# Patient Record
Sex: MALE | Race: WHITE | NOT HISPANIC OR LATINO | Employment: FULL TIME | URBAN - METROPOLITAN AREA
[De-identification: names, ages, dates, MRNs, and addresses within clinical notes are randomized per-mention and may not be internally consistent; named-entity substitution may affect disease eponyms.]

---
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
## 2024-02-18 ENCOUNTER — HOSPITAL ENCOUNTER (INPATIENT)
Facility: HOSPITAL | Age: 19
LOS: 6 days | Discharge: HOME | DRG: 558 | End: 2024-02-25
Attending: EMERGENCY MEDICINE | Admitting: INTERNAL MEDICINE
Payer: COMMERCIAL

## 2024-02-18 DIAGNOSIS — M79.89 ARM SWELLING: ICD-10-CM

## 2024-02-18 DIAGNOSIS — M62.82 RHABDOMYOLYSIS: Primary | ICD-10-CM

## 2024-02-18 DIAGNOSIS — R79.89 ELEVATED LFTS: ICD-10-CM

## 2024-02-18 LAB
BACTERIA UR QL AUTO: ABNORMAL /HPF
BASOPHILS # BLD AUTO: 0.07 THOUSANDS/ÂΜL (ref 0–0.1)
BASOPHILS NFR BLD AUTO: 1 % (ref 0–1)
BILIRUB UR QL STRIP: NEGATIVE
CLARITY UR: CLEAR
COLOR UR: ABNORMAL
EOSINOPHIL # BLD AUTO: 0.13 THOUSAND/ÂΜL (ref 0–0.61)
EOSINOPHIL NFR BLD AUTO: 1 % (ref 0–6)
ERYTHROCYTE [DISTWIDTH] IN BLOOD BY AUTOMATED COUNT: 13 % (ref 11.6–15.1)
GLUCOSE UR STRIP-MCNC: NEGATIVE MG/DL
HCT VFR BLD AUTO: 42 % (ref 36.5–49.3)
HGB BLD-MCNC: 15.2 G/DL (ref 12–17)
HGB UR QL STRIP.AUTO: ABNORMAL
IMM GRANULOCYTES # BLD AUTO: 0.05 THOUSAND/UL (ref 0–0.2)
IMM GRANULOCYTES NFR BLD AUTO: 0 % (ref 0–2)
KETONES UR STRIP-MCNC: ABNORMAL MG/DL
LEUKOCYTE ESTERASE UR QL STRIP: NEGATIVE
LYMPHOCYTES # BLD AUTO: 1.66 THOUSANDS/ÂΜL (ref 0.6–4.47)
LYMPHOCYTES NFR BLD AUTO: 12 % (ref 14–44)
MCH RBC QN AUTO: 31.2 PG (ref 26.8–34.3)
MCHC RBC AUTO-ENTMCNC: 36.2 G/DL (ref 31.4–37.4)
MCV RBC AUTO: 86 FL (ref 82–98)
MONOCYTES # BLD AUTO: 0.92 THOUSAND/ÂΜL (ref 0.17–1.22)
MONOCYTES NFR BLD AUTO: 6 % (ref 4–12)
MUCOUS THREADS UR QL AUTO: ABNORMAL
NEUTROPHILS # BLD AUTO: 11.62 THOUSANDS/ÂΜL (ref 1.85–7.62)
NEUTS SEG NFR BLD AUTO: 80 % (ref 43–75)
NITRITE UR QL STRIP: NEGATIVE
NON-SQ EPI CELLS URNS QL MICRO: ABNORMAL /HPF
NRBC BLD AUTO-RTO: 0 /100 WBCS
PH UR STRIP.AUTO: 6 [PH]
PLATELET # BLD AUTO: 307 THOUSANDS/UL (ref 149–390)
PMV BLD AUTO: 9.4 FL (ref 8.9–12.7)
PROT UR STRIP-MCNC: ABNORMAL MG/DL
RBC # BLD AUTO: 4.87 MILLION/UL (ref 3.88–5.62)
RBC #/AREA URNS AUTO: ABNORMAL /HPF
SP GR UR STRIP.AUTO: 1.03 (ref 1–1.03)
UROBILINOGEN UR STRIP-ACNC: 2 MG/DL
WBC # BLD AUTO: 14.45 THOUSAND/UL (ref 4.31–10.16)
WBC #/AREA URNS AUTO: ABNORMAL /HPF

## 2024-02-18 PROCEDURE — 81001 URINALYSIS AUTO W/SCOPE: CPT

## 2024-02-18 PROCEDURE — 85025 COMPLETE CBC W/AUTO DIFF WBC: CPT

## 2024-02-18 PROCEDURE — 96365 THER/PROPH/DIAG IV INF INIT: CPT

## 2024-02-18 PROCEDURE — 82550 ASSAY OF CK (CPK): CPT

## 2024-02-18 PROCEDURE — 80053 COMPREHEN METABOLIC PANEL: CPT

## 2024-02-18 PROCEDURE — 96366 THER/PROPH/DIAG IV INF ADDON: CPT

## 2024-02-18 PROCEDURE — 36415 COLL VENOUS BLD VENIPUNCTURE: CPT

## 2024-02-18 PROCEDURE — 99284 EMERGENCY DEPT VISIT MOD MDM: CPT

## 2024-02-18 PROCEDURE — 99285 EMERGENCY DEPT VISIT HI MDM: CPT | Performed by: EMERGENCY MEDICINE

## 2024-02-18 RX ORDER — SODIUM CHLORIDE, SODIUM GLUCONATE, SODIUM ACETATE, POTASSIUM CHLORIDE, MAGNESIUM CHLORIDE, SODIUM PHOSPHATE, DIBASIC, AND POTASSIUM PHOSPHATE .53; .5; .37; .037; .03; .012; .00082 G/100ML; G/100ML; G/100ML; G/100ML; G/100ML; G/100ML; G/100ML
1000 INJECTION, SOLUTION INTRAVENOUS ONCE
Status: COMPLETED | OUTPATIENT
Start: 2024-02-18 | End: 2024-02-19

## 2024-02-18 RX ORDER — SODIUM CHLORIDE, SODIUM GLUCONATE, SODIUM ACETATE, POTASSIUM CHLORIDE, MAGNESIUM CHLORIDE, SODIUM PHOSPHATE, DIBASIC, AND POTASSIUM PHOSPHATE .53; .5; .37; .037; .03; .012; .00082 G/100ML; G/100ML; G/100ML; G/100ML; G/100ML; G/100ML; G/100ML
1000 INJECTION, SOLUTION INTRAVENOUS ONCE
Status: COMPLETED | OUTPATIENT
Start: 2024-02-19 | End: 2024-02-19

## 2024-02-18 RX ADMIN — SODIUM CHLORIDE, SODIUM GLUCONATE, SODIUM ACETATE, POTASSIUM CHLORIDE, MAGNESIUM CHLORIDE, SODIUM PHOSPHATE, DIBASIC, AND POTASSIUM PHOSPHATE 1000 ML: .53; .5; .37; .037; .03; .012; .00082 INJECTION, SOLUTION INTRAVENOUS at 22:49

## 2024-02-18 NOTE — LETTER
[facility] [address]  [address] [address]  Dept: [phone]    February 25, 2024     Patient: Michael Chan   YOB: 2005   Date of Visit: 2/18/2024       To Whom it May Concern:    Michael Chan is under my professional care. He was seen in the hospital from 2/18/2024 to 02/25/24. He may return to school on 2/26/24 without limitations.    If you have any questions or concerns, please don't hesitate to call.         Sincerely,          Anthony Valentine, DO

## 2024-02-19 ENCOUNTER — APPOINTMENT (INPATIENT)
Dept: NON INVASIVE DIAGNOSTICS | Facility: HOSPITAL | Age: 19
DRG: 558 | End: 2024-02-19
Payer: COMMERCIAL

## 2024-02-19 PROBLEM — M79.89 SWELLING OF ARM: Status: ACTIVE | Noted: 2024-02-19

## 2024-02-19 PROBLEM — E87.6 HYPOKALEMIA: Status: ACTIVE | Noted: 2024-02-19

## 2024-02-19 PROBLEM — M62.82 RHABDOMYOLYSIS: Status: ACTIVE | Noted: 2024-02-19

## 2024-02-19 LAB
ALBUMIN SERPL BCP-MCNC: 3.4 G/DL (ref 3.5–5)
ALBUMIN SERPL BCP-MCNC: 4.2 G/DL (ref 3.5–5)
ALP SERPL-CCNC: 44 U/L (ref 34–104)
ALP SERPL-CCNC: 55 U/L (ref 34–104)
ALT SERPL W P-5'-P-CCNC: 185 U/L (ref 7–52)
ALT SERPL W P-5'-P-CCNC: 238 U/L (ref 7–52)
AMPHETAMINES SERPL QL SCN: NEGATIVE
ANION GAP SERPL CALCULATED.3IONS-SCNC: 7 MMOL/L
ANION GAP SERPL CALCULATED.3IONS-SCNC: 9 MMOL/L
APTT PPP: 29 SECONDS (ref 23–37)
APTT PPP: 30 SECONDS (ref 23–37)
AST SERPL W P-5'-P-CCNC: 693 U/L (ref 13–39)
AST SERPL W P-5'-P-CCNC: 984 U/L (ref 13–39)
BARBITURATES UR QL: NEGATIVE
BASOPHILS # BLD AUTO: 0.08 THOUSANDS/ÂΜL (ref 0–0.1)
BASOPHILS NFR BLD AUTO: 1 % (ref 0–1)
BENZODIAZ UR QL: NEGATIVE
BILIRUB SERPL-MCNC: 0.41 MG/DL (ref 0.2–1)
BILIRUB SERPL-MCNC: 0.71 MG/DL (ref 0.2–1)
BUN SERPL-MCNC: 13 MG/DL (ref 5–25)
BUN SERPL-MCNC: 21 MG/DL (ref 5–25)
CALCIUM ALBUM COR SERPL-MCNC: 8.3 MG/DL (ref 8.3–10.1)
CALCIUM SERPL-MCNC: 7.8 MG/DL (ref 8.4–10.2)
CALCIUM SERPL-MCNC: 8.8 MG/DL (ref 8.4–10.2)
CHLORIDE SERPL-SCNC: 104 MMOL/L (ref 96–108)
CHLORIDE SERPL-SCNC: 106 MMOL/L (ref 96–108)
CK SERPL-CCNC: ABNORMAL U/L (ref 39–308)
CK SERPL-CCNC: ABNORMAL U/L (ref 39–308)
CO2 SERPL-SCNC: 25 MMOL/L (ref 21–32)
CO2 SERPL-SCNC: 25 MMOL/L (ref 21–32)
COCAINE UR QL: NEGATIVE
CREAT SERPL-MCNC: 0.65 MG/DL (ref 0.6–1.3)
CREAT SERPL-MCNC: 0.8 MG/DL (ref 0.6–1.3)
CRP SERPL QL: 10.4 MG/L
EOSINOPHIL # BLD AUTO: 0.15 THOUSAND/ÂΜL (ref 0–0.61)
EOSINOPHIL NFR BLD AUTO: 2 % (ref 0–6)
ERYTHROCYTE [DISTWIDTH] IN BLOOD BY AUTOMATED COUNT: 13.1 % (ref 11.6–15.1)
ERYTHROCYTE [SEDIMENTATION RATE] IN BLOOD: 1 MM/HOUR (ref 0–14)
FLUAV RNA RESP QL NAA+PROBE: NEGATIVE
FLUBV RNA RESP QL NAA+PROBE: NEGATIVE
GFR SERPL CREATININE-BSD FRML MDRD: 129 ML/MIN/1.73SQ M
GFR SERPL CREATININE-BSD FRML MDRD: 141 ML/MIN/1.73SQ M
GLUCOSE SERPL-MCNC: 121 MG/DL (ref 65–140)
GLUCOSE SERPL-MCNC: 91 MG/DL (ref 65–140)
HCT VFR BLD AUTO: 36.7 % (ref 36.5–49.3)
HETEROPH AB SER QL: NEGATIVE
HGB BLD-MCNC: 12.8 G/DL (ref 12–17)
IMM GRANULOCYTES # BLD AUTO: 0.04 THOUSAND/UL (ref 0–0.2)
IMM GRANULOCYTES NFR BLD AUTO: 0 % (ref 0–2)
INR PPP: 1.14 (ref 0.84–1.19)
INR PPP: 1.17 (ref 0.84–1.19)
LYMPHOCYTES # BLD AUTO: 1.88 THOUSANDS/ÂΜL (ref 0.6–4.47)
LYMPHOCYTES NFR BLD AUTO: 19 % (ref 14–44)
MAGNESIUM SERPL-MCNC: 1.9 MG/DL (ref 1.9–2.7)
MCH RBC QN AUTO: 31.1 PG (ref 26.8–34.3)
MCHC RBC AUTO-ENTMCNC: 34.9 G/DL (ref 31.4–37.4)
MCV RBC AUTO: 89 FL (ref 82–98)
METHADONE UR QL: NEGATIVE
MONOCYTES # BLD AUTO: 0.93 THOUSAND/ÂΜL (ref 0.17–1.22)
MONOCYTES NFR BLD AUTO: 9 % (ref 4–12)
NEUTROPHILS # BLD AUTO: 6.99 THOUSANDS/ÂΜL (ref 1.85–7.62)
NEUTS SEG NFR BLD AUTO: 69 % (ref 43–75)
NRBC BLD AUTO-RTO: 0 /100 WBCS
OPIATES UR QL SCN: NEGATIVE
OXYCODONE+OXYMORPHONE UR QL SCN: NEGATIVE
PCP UR QL: NEGATIVE
PHOSPHATE SERPL-MCNC: 3.2 MG/DL (ref 2.7–4.5)
PLATELET # BLD AUTO: 240 THOUSANDS/UL (ref 149–390)
PMV BLD AUTO: 9.6 FL (ref 8.9–12.7)
POTASSIUM SERPL-SCNC: 3.3 MMOL/L (ref 3.5–5.3)
POTASSIUM SERPL-SCNC: 3.7 MMOL/L (ref 3.5–5.3)
PROT SERPL-MCNC: 5.1 G/DL (ref 6.4–8.4)
PROT SERPL-MCNC: 6.4 G/DL (ref 6.4–8.4)
PROTHROMBIN TIME: 14.5 SECONDS (ref 11.6–14.5)
PROTHROMBIN TIME: 14.8 SECONDS (ref 11.6–14.5)
RBC # BLD AUTO: 4.12 MILLION/UL (ref 3.88–5.62)
RSV RNA RESP QL NAA+PROBE: NEGATIVE
SARS-COV-2 RNA RESP QL NAA+PROBE: NEGATIVE
SODIUM SERPL-SCNC: 138 MMOL/L (ref 135–147)
SODIUM SERPL-SCNC: 138 MMOL/L (ref 135–147)
THC UR QL: NEGATIVE
TSH SERPL DL<=0.05 MIU/L-ACNC: 1.92 UIU/ML (ref 0.45–4.5)
WBC # BLD AUTO: 10.07 THOUSAND/UL (ref 4.31–10.16)

## 2024-02-19 PROCEDURE — 99254 IP/OBS CNSLTJ NEW/EST MOD 60: CPT | Performed by: SURGERY

## 2024-02-19 PROCEDURE — 99222 1ST HOSP IP/OBS MODERATE 55: CPT | Performed by: INTERNAL MEDICINE

## 2024-02-19 PROCEDURE — 96376 TX/PRO/DX INJ SAME DRUG ADON: CPT

## 2024-02-19 PROCEDURE — 93970 EXTREMITY STUDY: CPT

## 2024-02-19 PROCEDURE — 84100 ASSAY OF PHOSPHORUS: CPT | Performed by: INTERNAL MEDICINE

## 2024-02-19 PROCEDURE — 93970 EXTREMITY STUDY: CPT | Performed by: SURGERY

## 2024-02-19 PROCEDURE — 85652 RBC SED RATE AUTOMATED: CPT | Performed by: INTERNAL MEDICINE

## 2024-02-19 PROCEDURE — 0241U HB NFCT DS VIR RESP RNA 4 TRGT: CPT | Performed by: INTERNAL MEDICINE

## 2024-02-19 PROCEDURE — 99223 1ST HOSP IP/OBS HIGH 75: CPT | Performed by: INTERNAL MEDICINE

## 2024-02-19 PROCEDURE — 85610 PROTHROMBIN TIME: CPT | Performed by: INTERNAL MEDICINE

## 2024-02-19 PROCEDURE — 80307 DRUG TEST PRSMV CHEM ANLYZR: CPT | Performed by: INTERNAL MEDICINE

## 2024-02-19 PROCEDURE — 86140 C-REACTIVE PROTEIN: CPT | Performed by: INTERNAL MEDICINE

## 2024-02-19 PROCEDURE — 82550 ASSAY OF CK (CPK): CPT | Performed by: INTERNAL MEDICINE

## 2024-02-19 PROCEDURE — 85730 THROMBOPLASTIN TIME PARTIAL: CPT | Performed by: INTERNAL MEDICINE

## 2024-02-19 PROCEDURE — 36415 COLL VENOUS BLD VENIPUNCTURE: CPT | Performed by: INTERNAL MEDICINE

## 2024-02-19 PROCEDURE — 84443 ASSAY THYROID STIM HORMONE: CPT | Performed by: INTERNAL MEDICINE

## 2024-02-19 PROCEDURE — 99232 SBSQ HOSP IP/OBS MODERATE 35: CPT | Performed by: NURSE PRACTITIONER

## 2024-02-19 PROCEDURE — 83735 ASSAY OF MAGNESIUM: CPT | Performed by: INTERNAL MEDICINE

## 2024-02-19 PROCEDURE — 86308 HETEROPHILE ANTIBODY SCREEN: CPT | Performed by: INTERNAL MEDICINE

## 2024-02-19 PROCEDURE — 99233 SBSQ HOSP IP/OBS HIGH 50: CPT | Performed by: STUDENT IN AN ORGANIZED HEALTH CARE EDUCATION/TRAINING PROGRAM

## 2024-02-19 PROCEDURE — 85025 COMPLETE CBC W/AUTO DIFF WBC: CPT | Performed by: INTERNAL MEDICINE

## 2024-02-19 PROCEDURE — 80053 COMPREHEN METABOLIC PANEL: CPT | Performed by: INTERNAL MEDICINE

## 2024-02-19 PROCEDURE — 87040 BLOOD CULTURE FOR BACTERIA: CPT | Performed by: INTERNAL MEDICINE

## 2024-02-19 RX ORDER — POTASSIUM CHLORIDE 20 MEQ/1
40 TABLET, EXTENDED RELEASE ORAL ONCE
Status: COMPLETED | OUTPATIENT
Start: 2024-02-19 | End: 2024-02-19

## 2024-02-19 RX ORDER — SODIUM CHLORIDE, SODIUM LACTATE, POTASSIUM CHLORIDE, CALCIUM CHLORIDE 600; 310; 30; 20 MG/100ML; MG/100ML; MG/100ML; MG/100ML
500 INJECTION, SOLUTION INTRAVENOUS CONTINUOUS
Status: DISCONTINUED | OUTPATIENT
Start: 2024-02-19 | End: 2024-02-19

## 2024-02-19 RX ORDER — SODIUM CHLORIDE, SODIUM GLUCONATE, SODIUM ACETATE, POTASSIUM CHLORIDE, MAGNESIUM CHLORIDE, SODIUM PHOSPHATE, DIBASIC, AND POTASSIUM PHOSPHATE .53; .5; .37; .037; .03; .012; .00082 G/100ML; G/100ML; G/100ML; G/100ML; G/100ML; G/100ML; G/100ML
250 INJECTION, SOLUTION INTRAVENOUS CONTINUOUS
Status: DISCONTINUED | OUTPATIENT
Start: 2024-02-19 | End: 2024-02-19

## 2024-02-19 RX ORDER — HEPARIN SODIUM 5000 [USP'U]/ML
5000 INJECTION, SOLUTION INTRAVENOUS; SUBCUTANEOUS EVERY 8 HOURS SCHEDULED
Status: DISCONTINUED | OUTPATIENT
Start: 2024-02-19 | End: 2024-02-21

## 2024-02-19 RX ORDER — SODIUM CHLORIDE, SODIUM LACTATE, POTASSIUM CHLORIDE, CALCIUM CHLORIDE 600; 310; 30; 20 MG/100ML; MG/100ML; MG/100ML; MG/100ML
100 INJECTION, SOLUTION INTRAVENOUS CONTINUOUS
Status: DISCONTINUED | OUTPATIENT
Start: 2024-02-19 | End: 2024-02-24

## 2024-02-19 RX ADMIN — SODIUM CHLORIDE, SODIUM LACTATE, POTASSIUM CHLORIDE, AND CALCIUM CHLORIDE 500 ML/HR: .6; .31; .03; .02 INJECTION, SOLUTION INTRAVENOUS at 08:45

## 2024-02-19 RX ADMIN — SODIUM CHLORIDE, SODIUM LACTATE, POTASSIUM CHLORIDE, AND CALCIUM CHLORIDE 500 ML/HR: .6; .31; .03; .02 INJECTION, SOLUTION INTRAVENOUS at 15:11

## 2024-02-19 RX ADMIN — SODIUM CHLORIDE, SODIUM LACTATE, POTASSIUM CHLORIDE, AND CALCIUM CHLORIDE 250 ML/HR: .6; .31; .03; .02 INJECTION, SOLUTION INTRAVENOUS at 22:19

## 2024-02-19 RX ADMIN — SODIUM CHLORIDE, SODIUM LACTATE, POTASSIUM CHLORIDE, AND CALCIUM CHLORIDE 250 ML/HR: .6; .31; .03; .02 INJECTION, SOLUTION INTRAVENOUS at 21:15

## 2024-02-19 RX ADMIN — SODIUM CHLORIDE, SODIUM LACTATE, POTASSIUM CHLORIDE, AND CALCIUM CHLORIDE 500 ML/HR: .6; .31; .03; .02 INJECTION, SOLUTION INTRAVENOUS at 19:11

## 2024-02-19 RX ADMIN — SODIUM CHLORIDE, SODIUM LACTATE, POTASSIUM CHLORIDE, AND CALCIUM CHLORIDE 500 ML/HR: .6; .31; .03; .02 INJECTION, SOLUTION INTRAVENOUS at 04:28

## 2024-02-19 RX ADMIN — SODIUM CHLORIDE, SODIUM LACTATE, POTASSIUM CHLORIDE, AND CALCIUM CHLORIDE 500 ML/HR: .6; .31; .03; .02 INJECTION, SOLUTION INTRAVENOUS at 02:34

## 2024-02-19 RX ADMIN — POTASSIUM CHLORIDE 40 MEQ: 1500 TABLET, EXTENDED RELEASE ORAL at 07:35

## 2024-02-19 RX ADMIN — SODIUM CHLORIDE, SODIUM LACTATE, POTASSIUM CHLORIDE, AND CALCIUM CHLORIDE 500 ML/HR: .6; .31; .03; .02 INJECTION, SOLUTION INTRAVENOUS at 12:54

## 2024-02-19 RX ADMIN — SODIUM CHLORIDE, SODIUM LACTATE, POTASSIUM CHLORIDE, AND CALCIUM CHLORIDE 500 ML/HR: .6; .31; .03; .02 INJECTION, SOLUTION INTRAVENOUS at 06:40

## 2024-02-19 RX ADMIN — SODIUM CHLORIDE, SODIUM LACTATE, POTASSIUM CHLORIDE, AND CALCIUM CHLORIDE 500 ML/HR: .6; .31; .03; .02 INJECTION, SOLUTION INTRAVENOUS at 10:49

## 2024-02-19 RX ADMIN — SODIUM CHLORIDE, SODIUM GLUCONATE, SODIUM ACETATE, POTASSIUM CHLORIDE, MAGNESIUM CHLORIDE, SODIUM PHOSPHATE, DIBASIC, AND POTASSIUM PHOSPHATE 1000 ML: .53; .5; .37; .037; .03; .012; .00082 INJECTION, SOLUTION INTRAVENOUS at 00:36

## 2024-02-19 RX ADMIN — SODIUM CHLORIDE, SODIUM LACTATE, POTASSIUM CHLORIDE, AND CALCIUM CHLORIDE 500 ML/HR: .6; .31; .03; .02 INJECTION, SOLUTION INTRAVENOUS at 17:10

## 2024-02-19 NOTE — PROGRESS NOTES
"[facility]  Progress Note  Name: Michael Chan I  MRN: [id number]  Unit/Bed#: ED 10 I Date of Admission: 2/18/2024   Date of Service: 2/19/2024 I Hospital Day: 0    Assessment/Plan   * Rhabdomyolysis  Assessment & Plan  Reports bilateral upper extremity \"tightness\" that began about 8 hours after he exercised at the [employer] gym this past Thursday; reports waking up the next morning and noticing that both of his arms were significantly swollen although not painful.  Reports that swelling worsened prompting him to come to the ER.  Patient reports that he did use weights including performing dumbbell bicep curls as well as bench press; reports that last time that he worked out similar to this was in November.  Denies that the workout was far in excess of what he has done in the past and reports that he was at the gym about 45 minutes  CPK in the ER greater than 60,000 with noted AST/ALT elevations  Creatinine within normal limits  Noted mild leukocytosis of 14.0 --> 10.07  Denies any other symptoms besides the swelling of the arms but denies that the arms are painful although does endorse that they feel \"tight\" feels now improving   On exam, bilateral upper extremities with noticeable tense swelling although patient denies any tenderness to palpation of the extremities; upper extremity proximal and distal pulses intact; no rashes  Patient given 2 L IV fluid in the ER - continues to receive iv fluids  Admit to medicine on telemetry.    For patient's rhabdomyolysis, we will put on aggressive IV fluids of lactated Ringer's at 500 cc/h continuous.  Monitor I's and O's.    Appreciate nephrology recommendations    Would reduce rate of iv fluids if patient tolerating oral diet well or if pts arms increase in size   Given patient's tense swollen bilateral upper extremities - requested sx eval dt concern for compartment syndrome - continue to monitor continue current tx plan "   Bilateral venous ultrasounds of the upper extremities: Negative for DVT   Blood cultures in process  CK 74944-->93014  ESR 1  CRP 10.4  COVID/flu/RSV- negative   Coags - stable   Monospot - negative   Rapid drug screen - negative   No infectious etiology suspected at this time     Hypokalemia  Assessment & Plan  Most likely due to high volume if fluids   Replete potassium      Swelling of arm  Assessment & Plan  See rhabdo section above           VTE Pharmacologic Prophylaxis: VTE Score: 2 High Risk (Score >/= 5) - Pharmacological DVT Prophylaxis Ordered: heparin. Sequential Compression Devices Ordered.    Mobility:      HLM Goal achieved. Continue to encourage appropriate mobility.    Patient Centered Rounds: I performed bedside rounds with nursing staff today.   Discussions with Specialists or Other Care Team Provider: nursing and nephrology     Education and Discussions with Family / Patient: Patient declined call to .     Total Time Spent on Date of Encounter in care of patient: 35 mins. This time was spent on one or more of the following: performing physical exam; counseling and coordination of care; obtaining or reviewing history; documenting in the medical record; reviewing/ordering tests, medications or procedures; communicating with other healthcare professionals and discussing with patient's family/caregivers.    Current Length of Stay: 0 day(s)  Current Patient Status: Inpatient   Certification Statement: The patient will continue to require additional inpatient hospital stay due to need for high volume iv fluids   Discharge Plan: Anticipate discharge in 24-48 hrs to home.    Code Status: Level 1 - Full Code    Subjective:   Pt still report some tightness arms no problems with legs . Understands plan of care at this time. No sob no chest pain.     Objective:     Vitals:   Temp (24hrs), Av.1 °F (36.7 °C), Min:98.1 °F (36.7 °C), Max:98.1 °F (36.7 °C)    Temp:  [98.1 °F (36.7 °C)] 98.1 °F  (36.7 °C)  HR:  [] 83  Resp:  [14-18] 18  BP: (129-160)/(63-94) 139/67  SpO2:  [95 %-98 %] 98 %  There is no height or weight on file to calculate BMI.     Input and Output Summary (last 24 hours):     Intake/Output Summary (Last 24 hours) at 2/19/2024 1505  Last data filed at 2/19/2024 1441  Gross per 24 hour   Intake 2000 ml   Output 4170 ml   Net -2170 ml       Physical Exam:   Physical Exam  Constitutional:       General: He is not in acute distress.     Appearance: He is not ill-appearing, toxic-appearing or diaphoretic.   HENT:      Head: Normocephalic and atraumatic.   Eyes:      General:         Right eye: No discharge.         Left eye: No discharge.   Cardiovascular:      Rate and Rhythm: Normal rate.      Heart sounds: No murmur heard.     No friction rub. No gallop.   Pulmonary:      Effort: No respiratory distress.      Breath sounds: No stridor. No wheezing, rhonchi or rales.   Chest:      Chest wall: No tenderness.   Abdominal:      General: There is no distension.      Palpations: There is no mass.      Tenderness: There is no abdominal tenderness. There is no guarding or rebound.      Hernia: No hernia is present.   Musculoskeletal:         General: Swelling (bl arms (improving per pt)) present. No tenderness, deformity or signs of injury.      Right lower leg: No edema.      Left lower leg: No edema.   Skin:     Coloration: Skin is not jaundiced or pale.      Findings: No bruising, erythema, lesion or rash.   Neurological:      Mental Status: He is alert and oriented to person, place, and time.   Psychiatric:         Behavior: Behavior normal.          Additional Data:     Labs:  Results from last 7 days   Lab Units 02/19/24  0433   WBC Thousand/uL 10.07   HEMOGLOBIN g/dL 12.8   HEMATOCRIT % 36.7   PLATELETS Thousands/uL 240   NEUTROS PCT % 69   LYMPHS PCT % 19   MONOS PCT % 9   EOS PCT % 2     Results from last 7 days   Lab Units 02/19/24  0433   SODIUM mmol/L 138   POTASSIUM mmol/L 3.3*    CHLORIDE mmol/L 106   CO2 mmol/L 25   BUN mg/dL 13   CREATININE mg/dL 0.65   ANION GAP mmol/L 7   CALCIUM mg/dL 7.8*   ALBUMIN g/dL 3.4*   TOTAL BILIRUBIN mg/dL 0.41   ALK PHOS U/L 44   ALT U/L 185*   AST U/L 693*   GLUCOSE RANDOM mg/dL 91     Results from last 7 days   Lab Units 02/19/24  0433   INR  1.17                   Lines/Drains:  Invasive Devices       Peripheral Intravenous Line  Duration             Peripheral IV 02/18/24 Distal;Left;Upper;Ventral (anterior) Arm 1 day    Peripheral IV 02/19/24 Dorsal (posterior);Left Hand <1 day                      Telemetry:  Telemetry Orders (From admission, onward)               24 Hour Telemetry Monitoring  Continuous x 24 Hours (Telem)        Question:  Reason for 24 Hour Telemetry  Answer:  Metabolic/electrolyte disturbance with high probability of dysrhythmia. K level <3 or >6 OR KCL infusion >10mEq/hr                     Telemetry Reviewed: Normal Sinus Rhythm  Indication for Continued Telemetry Use: Metabolic/electrolyte disturbance with high probability of dysrhythmia             Imaging: No pertinent imaging reviewed.    Recent Cultures (last 7 days):   Results from last 7 days   Lab Units 02/19/24  0211 02/19/24  0207   BLOOD CULTURE  Received in Microbiology Lab. Culture in Progress. Received in Microbiology Lab. Culture in Progress.       Last 24 Hours Medication List:   Current Facility-Administered Medications   Medication Dose Route Frequency Provider Last Rate    heparin (porcine)  5,000 Units Subcutaneous Q8H [facility] Oliverio Chan DO      lactated ringers  500 mL/hr Intravenous Continuous Oliverio Chan  mL/hr (02/19/24 1254)        Today, Patient Was Seen By: SHAINA Carr    **Please Note: This note may have been constructed using a voice recognition system.**

## 2024-02-19 NOTE — H&P
"[facility]  H&P  Name: Michael Chan 18 y.o. male I MRN: [id number]  Unit/Bed#: ED 10 I Date of Admission: 2/18/2024   Date of Service: 2/19/2024 I Hospital Day: 0      Assessment/Plan   * Rhabdomyolysis  Assessment & Plan  Reports bilateral upper extremity \"tightness\" that began about 8 hours after he exercised at the [employer] gym this past Thursday; reports waking up the next morning and noticing that both of his arms were significantly swollen although not painful.  Reports that swelling worsened prompting him to come to the ER.  Patient reports that he did use weights including performing dumbbell bicep curls as well as bench press; reports that last time that he worked out similar to this was in November.  Denies that the workout was far in excess of what he has done in the past and reports that he was at the gym about 45 minutes  CPK in the ER greater than 60,000 with noted AST/ALT elevations  Creatinine within normal limits  Noted mild leukocytosis of 14 K  Denies any other symptoms besides the swelling of the arms but denies that the arms are painful although does endorse that they feel \"tight\"  On exam, bilateral upper extremities with noticeable tense swelling although patient denies any tenderness to palpation of the extremities; upper extremity proximal and distal pulses intact; no rashes  Patient given 2 L IV fluid in the ER  Admit to medicine on telemetry.  For patient's rhabdomyolysis, we will put on aggressive IV fluids of lactated Ringer's at 500 cc/h continuous.  Monitor I's and O's.  Consult nephrology.  Given patient's tense swollen bilateral upper extremities, have reached out to surgery team regarding assistance in their clinical suspicion in monitoring for any possible development of compartment syndrome.  We will additionally order bilateral venous ultrasounds of the upper extremities to rule out DVTs.   With regards to the etiology of " patient's swelling of upper extremities in the setting of his rhabdomyolysis, will need to investigate for other potential etiologies including infectious as well as rheumatologic abnormalities.  Will order blood cultures, ESR/CRP, COVID/flu/RSV, coags, Monospot.  As patient currently afebrile, holding off any empiric antimicrobials as no clear evidence of current infection.  Will continue to monitor patient's WBC closely as well as vitals and low threshold for empiric antibiotics.    Swelling of arm  Assessment & Plan  See rhabdo section above               VTE Prophylaxis: sequential compression device and foot pump applied   Code Status: Level 1 - Full Code       Anticipated Length of Stay:  Patient will be admitted on an Inpatient basis with an anticipated length of stay of  > 2 midnights.   Justification for Hospital Stay: Please see detailed plans noted above.    Chief Complaint:     Bilateral arm swelling, rhabdo my lysis  History of Present Illness:  Michael Chan is a 18 y.o. male who has past medical history significant for seasonal allergies who presented to [facility] ER on the evening of 2/18 with progressively worsening bilateral upper extremity swelling since this past Thursday.  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  He reports that this past Thursday he went to the gym at around 11 AM and had a 45-minute workout in which she reports using weights and doing bench press as well as dumbbell curls.  He reports that this was not a significantly different weight lifting regimen than what he has done in the past although he does report that the last time he did lifted weights was around Thanksgiving.  He does report that in high school he was on the football team and did often lift weights.  He reports that about 8 hours after his workout he started to feel bilateral upper extremity mild soreness.  He denies that the arms were swollen about 8 hours after.   "He reports that he then went to bed and the next morning when he woke up and took a shower he looked at his arms and noticed that they were both considerably swollen.  He denies that his arms were painful but reports that they were \"tight\".  He reports that the swelling persisted over the weekend and seem to slightly worsen which prompted him to come to the ER.  He denies that the arms are painful.  He does report some mild fatigue.  Patient denies any fevers, chills, rashes, sore throat, chest pain, shortness of breath, leg pain, abnormal stools or headache.  Patient denies using any IV drugs.  Patient denies using tobacco.  Patient reports that he socially drinks alcohol but only on the weekends.  Patient denies any history of rheumatologic diseases.  Patient's father does report that he himself is being worked up for possible inflammatory arthritis.  Patient denies any surgeries.  Patient denies that he takes any medications at home.      Review of Systems:    Constitutional:  Denies fever or chills   Eyes:  Denies change in visual acuity   HENT:  Denies nasal congestion or sore throat   Respiratory:  Denies cough or shortness of breath   Cardiovascular:  Denies chest pain or edema   GI:  Denies abdominal pain or bloody stools  :  Denies dysuria   Musculoskeletal: Positive for bilateral upper extremity swelling  Integument:  Denies rash   Neurologic:  Denies headache or sensory changes   Endocrine:  Denies polyuria or polydipsia   Lymphatic:  Denies swollen glands   Psychiatric:  Denies depression or anxiety     Past Medical and Surgical History:   History reviewed. No pertinent past medical history.  History reviewed. No pertinent surgical history.    Meds/Allergies:  (Not in a hospital admission)      Allergies:   Allergies   Allergen Reactions    Nuts - Food Allergy Facial Swelling       History:  Marital Status: Single     Substance Use History:   Social History     Substance and Sexual Activity   Alcohol " Use Never     Social History     Tobacco Use   Smoking Status Never   Smokeless Tobacco Never     Social History     Substance and Sexual Activity   Drug Use Never       Family History:  History reviewed. No pertinent family history.    Physical Exam:     Vitals:   Blood Pressure: 129/69 (02/19/24 0245)  Pulse: 81 (02/19/24 0245)  Temperature: 98.1 °F (36.7 °C) (02/18/24 2209)  Respirations: 17 (02/19/24 0245)  SpO2: 96 % (02/19/24 0245)    Constitutional: Awake, Alert, Normal conversation, Non-toxic appearance  Eyes:  EOMI, No scleral icterus   HENT:   oropharynx moist, no tonsilar exudates, external ears normal, external nose normal, unremarkable internal ear exams bilaterally with clear visiualization of cone of light bilaterally   Respiratory:  No respiratory distress, no wheezing   Cardiovascular:  Normal rate, no murmurs   GI:  Soft, nondistended, no guarding   :  No costovertebral angle tenderness   Musculoskeletal:  Swelling of the bilateral UE although no tenderness, skin boggy, extremities n/v intact both proximally and distally  Integument:  no jaundice, no rash   Neurologic:  Alert &awake, communicative, CN 2-12 normal,  no focal deficits noted   Psychiatric:  Speech and behavior appropriate       Lab Results: I have personally reviewed pertinent reports.  , I have personally reviewed pertinent films in PACS, and I have personally reviewed pertinent films in PACS with a Radiologist.    Results from last 7 days   Lab Units 02/18/24  2233   WBC Thousand/uL 14.45*   HEMOGLOBIN g/dL 15.2   HEMATOCRIT % 42.0   PLATELETS Thousands/uL 307   NEUTROS PCT % 80*   LYMPHS PCT % 12*   MONOS PCT % 6   EOS PCT % 1     Results from last 7 days   Lab Units 02/18/24  2233   POTASSIUM mmol/L 3.7   CHLORIDE mmol/L 104   CO2 mmol/L 25   BUN mg/dL 21   CREATININE mg/dL 0.80   CALCIUM mg/dL 8.8   ALK PHOS U/L 55   ALT U/L 238*   AST U/L 984*     Results from last 7 days   Lab Units 02/19/24  0207   INR  1.14         Imaging:  I have personally reviewed pertinent reports.  , I have personally reviewed pertinent films in PACS, and I have personally reviewed pertinent films in PACS with a Radiologist.    No results found.    Total time for visit, including counseling/coordination of care: 75 minutes. Greater than 50% of this total time spent on direct patient counseling and coorination of care.     Epic Records Reviewed as well as Records in Care Everywhere    ** Please Note: Dragon 360 Dictation voice to text software was used in the creation of this document. **

## 2024-02-19 NOTE — CONSULTS
Consultation - Nephrology   Michael Chan 18 y.o. male MRN: [id number]  Unit/Bed#: ED 10 Encounter: [id number]    ASSESSMENT:  Rhabdomyolysis- secondary to exercise induced  Agree with IVF  Renal function acceptable and normal  CPK trending down  Hypokalemia- replete as needed  Elevated liver enzymes- per primary team     PLAN:  Continue IVF  Replete K as needed  AM Labs      HISTORY OF PRESENT ILLNESS:  Requesting Physician: Brittany Forte MD  Reason for Consult: Rhabdomyolysis    Michael Chan is a 18 y.o. year old male who was admitted to [facility] after presenting with muscle pain and arm swelling.  A renal consultation is requested today for assistance in the management of rhabdomyolysis.  He states he worked out on Thursday at [facility] for the first time since Thanksgiving.  He noted some muscle soreness Thursday night but assumed this was due to not working out in a while.  He developed arm swelling and decided to come to the ER.  He was eating normally.  He does not take any medications except for vitamin C at home however he did take 2 Advil on Sunday.      PAST MEDICAL HISTORY:  History reviewed. No pertinent past medical history.    PAST SURGICAL HISTORY:  History reviewed. No pertinent surgical history.    ALLERGIES:  Allergies   Allergen Reactions    Nuts - Food Allergy Facial Swelling       SOCIAL HISTORY:  Social History     Substance and Sexual Activity   Alcohol Use Never     Social History     Substance and Sexual Activity   Drug Use Never     Social History     Tobacco Use   Smoking Status Never   Smokeless Tobacco Never       FAMILY HISTORY:  History reviewed. No pertinent family history.    MEDICATIONS:    Current Facility-Administered Medications:     heparin (porcine) subcutaneous injection 5,000 Units, 5,000 Units, Subcutaneous, Q8H [facility], Oliverio Chan DO    lactated ringers infusion, 500 mL/hr, Intravenous, Continuous, Oliverio Chan DO, Last Rate: 500  mL/hr at 02/19/24 1049, 500 mL/hr at 02/19/24 1049  No current outpatient medications on file.    REVIEW OF SYSTEMS:  A complete 10 point review of systems was performed and found to be negative unless otherwise noted in the history of present illness.  General: No fevers, chills.   Cardiovascular:  No chest pain, No leg edema.  Respiratory: No cough, sputum production,  No shortness of breath.  Gastrointestinal:  No nausea/vomiting,  No diarrhea,  No abdominal pain.  Genitourinary: No hematuria.  No foamy urine.  No dysuria.    PHYSICAL EXAM:  Current Weight:    First Weight:    Vitals:    02/19/24 0245 02/19/24 0445 02/19/24 0738 02/19/24 1000   BP: 129/69 141/94 138/78 139/67   BP Location: Left arm Left arm Left arm Left arm   Pulse: 81 62 88 83   Resp: 17 14 16 18   Temp:       SpO2: 96% 97% 98% 98%       Intake/Output Summary (Last 24 hours) at 2/19/2024 1137  Last data filed at 2/19/2024 0959  Gross per 24 hour   Intake 2000 ml   Output 2350 ml   Net -350 ml     General: NAD  Skin: no rash  Eyes: anicteric  ENMT: mm moist  Neck: no masses  Respiratory: CTAB  CVS: RRR  Extremities: no edema of lower extremities   GI: soft nt nd  Neuro: alert awake  Psych: mood and affect appropriate     Lab Results:   Results from last 7 days   Lab Units 02/19/24  0433 02/19/24  0207 02/18/24  2233   WBC Thousand/uL 10.07  --  14.45*   HEMOGLOBIN g/dL 12.8  --  15.2   HEMATOCRIT % 36.7  --  42.0   PLATELETS Thousands/uL 240  --  307   POTASSIUM mmol/L 3.3*  --  3.7   CHLORIDE mmol/L 106  --  104   CO2 mmol/L 25  --  25   BUN mg/dL 13  --  21   CREATININE mg/dL 0.65  --  0.80   CALCIUM mg/dL 7.8*  --  8.8   MAGNESIUM mg/dL 1.9  --   --    PHOSPHORUS mg/dL  --  3.2  --    ALK PHOS U/L 44  --  55   ALT U/L 185*  --  238*   AST U/L 693*  --  984*     I have personally reviewed the blood work as stated above and in my note.  I have personally reviewed surgery and slim note.

## 2024-02-19 NOTE — CONSULTS
Consultation - Surgery  Michael Chan 18 y.o. male MRN: [id number]  Unit/Bed#: ED 10 Encounter: [id number]        Assessment/Plan     Assessment:  Michael Chan is a 18 y.o. male who presents with progressive bilateral upper extremity swelling since Thursday (4days ago), with elevated CK suggestive of rhabdo, with no clinical signs of compartment syndrome    Plan:  Q1hr neurovascular checks  Discussed with patient alarm symptoms  IVF  Trehd CK and cre        History of Present Illness     HPI:  Michael Chan is a 18 y.o. male who presents with bilateral arm swelling. Patient states he had an aggressive arm and chest workout four days ago (Thursday) between his classes. Since that time he has noticed progressive arm swelling bilaterally, right worse than left. He denies pain but states his arms feel tight. He has no motor or sensory deficits. He has palpable radial pulses. Bilateral swelling noted to both arms from shoulders to wrists without discoloration, with fluctuance to bilateral elbows. Labs revealing CK 60,550, /, WBC 14.45.    Review of Systems   Constitutional: Negative.    HENT: Negative.     Eyes: Negative.    Respiratory: Negative.     Cardiovascular: Negative.    Gastrointestinal: Negative.    Endocrine: Negative.    Genitourinary: Negative.    Musculoskeletal: Negative.         Bilateral arm swelling   Skin: Negative.    Allergic/Immunologic: Negative.    Neurological: Negative.    Hematological: Negative.    Psychiatric/Behavioral: Negative.     All other systems reviewed and are negative.      Historical Information   History reviewed. No pertinent past medical history.  History reviewed. No pertinent surgical history.  Social History   Social History     Substance and Sexual Activity   Alcohol Use Never     Social History     Substance and Sexual Activity   Drug Use Never     Social History     Tobacco Use   Smoking Status Never   Smokeless Tobacco Never     Family History:  History reviewed. No pertinent family history.    Meds/Allergies   PTA meds:   None     Allergies   Allergen Reactions    Nuts - Food Allergy Facial Swelling       Objective   First Vitals:   Blood Pressure: 141/88 (02/18/24 2211)  Pulse: (!) 124 (02/18/24 2209)  Temperature: 98.1 °F (36.7 °C) (02/18/24 2209)  Respirations: 18 (02/18/24 2209)  SpO2: 95 % (02/18/24 2209)    Current Vitals:   Blood Pressure: 147/80 (02/19/24 0030)  Pulse: 84 (02/19/24 0030)  Temperature: 98.1 °F (36.7 °C) (02/18/24 2209)  Respirations: 18 (02/19/24 0030)  SpO2: 98 % (02/19/24 0030)      Intake/Output Summary (Last 24 hours) at 2/19/2024 0213  Last data filed at 2/19/2024 0037  Gross per 24 hour   Intake 1000 ml   Output --   Net 1000 ml       Invasive Devices       Peripheral Intravenous Line  Duration             Peripheral IV 02/18/24 Distal;Left;Upper;Ventral (anterior) Arm 1 day                    Physical Exam  Vitals reviewed.   Constitutional:       Appearance: He is normal weight. He is not toxic-appearing or diaphoretic.   HENT:      Head: Normocephalic and atraumatic.      Right Ear: External ear normal.      Left Ear: External ear normal.      Nose: Nose normal.      Mouth/Throat:      Mouth: Mucous membranes are moist.      Pharynx: Oropharynx is clear.   Eyes:      Extraocular Movements: Extraocular movements intact.      Conjunctiva/sclera: Conjunctivae normal.   Cardiovascular:      Rate and Rhythm: Normal rate.      Pulses: Normal pulses.      Comments: Palpable radial pulses bilaterally  Pulmonary:      Effort: Pulmonary effort is normal. No respiratory distress.   Musculoskeletal:         General: Swelling present. No tenderness or deformity. Normal range of motion.      Cervical back: Normal range of motion.      Right lower leg: No edema.      Left lower leg: No edema.      Comments: Bilateral arm swelling, R>L, nontender, no skin changes, no chest wall swelling or tenderness, motor and sensation intact with  "palpable radial pulses   Skin:     General: Skin is warm and dry.      Coloration: Skin is not jaundiced.      Findings: No bruising, erythema or rash.   Neurological:      General: No focal deficit present.      Cranial Nerves: No cranial nerve deficit.   Psychiatric:         Mood and Affect: Mood normal.         Thought Content: Thought content normal.         Lab Results: CBC:   Lab Results   Component Value Date    WBC 14.45 (H) 02/18/2024    HGB 15.2 02/18/2024    HCT 42.0 02/18/2024    MCV 86 02/18/2024     02/18/2024    RBC 4.87 02/18/2024    MCH 31.2 02/18/2024    MCHC 36.2 02/18/2024    RDW 13.0 02/18/2024    MPV 9.4 02/18/2024    NRBC 0 02/18/2024   , CMP:   Lab Results   Component Value Date    SODIUM 138 02/18/2024    K 3.7 02/18/2024     02/18/2024    CO2 25 02/18/2024    BUN 21 02/18/2024    CREATININE 0.80 02/18/2024    CALCIUM 8.8 02/18/2024     (H) 02/18/2024     (H) 02/18/2024    ALKPHOS 55 02/18/2024    EGFR 129 02/18/2024   , Coagulation: No results found for: \"PT\", \"INR\", \"APTT\", Urinalysis:   Lab Results   Component Value Date    COLORU Light Orange 02/18/2024    CLARITYU Clear 02/18/2024    SPECGRAV 1.026 02/18/2024    PHUR 6.0 02/18/2024    LEUKOCYTESUR Negative 02/18/2024    NITRITE Negative 02/18/2024    GLUCOSEU Negative 02/18/2024    KETONESU 80 (3+) (A) 02/18/2024    BILIRUBINUR Negative 02/18/2024    BLOODU Large (A) 02/18/2024   , Amylase: No results found for: \"AMYLASE\", Lipase: No results found for: \"LIPASE\"  Imaging: I have personally reviewed pertinent reports.    EKG, Pathology, and Other Studies: I have personally reviewed pertinent reports.      Code Status: Level 1 - Full Code  Advance Directive and Living Will:      Power of :    POLST:        "

## 2024-02-19 NOTE — ASSESSMENT & PLAN NOTE
"Reports bilateral upper extremity \"tightness\" that began about 8 hours after he exercised at the [employer] gym this past Thursday; reports waking up the next morning and noticing that both of his arms were significantly swollen although not painful.  Reports that swelling worsened prompting him to come to the ER.  Patient reports that he did use weights including performing dumbbell bicep curls as well as bench press; reports that last time that he worked out similar to this was in November.  Denies that the workout was far in excess of what he has done in the past and reports that he was at the gym about 45 minutes  CPK in the ER greater than 60,000 with noted AST/ALT elevations  Creatinine within normal limits  Noted mild leukocytosis of 14 K  Denies any other symptoms besides the swelling of the arms but denies that the arms are painful although does endorse that they feel \"tight\"  On exam, bilateral upper extremities with noticeable tense swelling although patient denies any tenderness to palpation of the extremities; upper extremity proximal and distal pulses intact; no rashes  Patient given 2 L IV fluid in the ER  Admit to medicine on telemetry.  For patient's rhabdomyolysis, we will put on aggressive IV fluids of lactated Ringer's at 500 cc/h continuous.  Monitor I's and O's.  Consult nephrology.  Given patient's tense swollen bilateral upper extremities, have reached out to surgery team regarding assistance in their clinical suspicion in monitoring for any possible development of compartment syndrome.  We will additionally order bilateral venous ultrasounds of the upper extremities to rule out DVTs.   With regards to the etiology of patient's swelling of upper extremities in the setting of his rhabdomyolysis, will need to investigate for other potential etiologies including infectious as well as rheumatologic abnormalities.  Will order blood cultures, ESR/CRP, COVID/flu/RSV, coags, Monospot.  As " patient currently afebrile, holding off any empiric antimicrobials as no clear evidence of current infection.  Will continue to monitor patient's WBC closely as well as vitals and low threshold for empiric antibiotics.

## 2024-02-19 NOTE — ASSESSMENT & PLAN NOTE
"Reports bilateral upper extremity \"tightness\" that began about 8 hours after he exercised at the [employer] gym this past Thursday; reports waking up the next morning and noticing that both of his arms were significantly swollen although not painful.  Reports that swelling worsened prompting him to come to the ER.  Patient reports that he did use weights including performing dumbbell bicep curls as well as bench press; reports that last time that he worked out similar to this was in November.  Denies that the workout was far in excess of what he has done in the past and reports that he was at the gym about 45 minutes  CPK in the ER greater than 60,000 with noted AST/ALT elevations  Creatinine within normal limits  Noted mild leukocytosis of 14.0 --> 10.07  Denies any other symptoms besides the swelling of the arms but denies that the arms are painful although does endorse that they feel \"tight\" feels now improving   On exam, bilateral upper extremities with noticeable tense swelling although patient denies any tenderness to palpation of the extremities; upper extremity proximal and distal pulses intact; no rashes  Patient given 2 L IV fluid in the ER - continues to receive iv fluids  Admit to medicine on telemetry.    For patient's rhabdomyolysis, we will put on aggressive IV fluids of lactated Ringer's at 500 cc/h continuous.  Monitor I's and O's.    Appreciate nephrology recommendations    Would reduce rate of iv fluids if patient tolerating oral diet well or if pts arms increase in size   Given patient's tense swollen bilateral upper extremities - requested sx eval dt concern for compartment syndrome - continue to monitor continue current tx plan   Bilateral venous ultrasounds of the upper extremities: Negative for DVT   Blood cultures in process  CK 41294-->62826  ESR 1  CRP 10.4  COVID/flu/RSV- negative   Coags - stable   Monospot - negative   Rapid drug screen - negative   No infectious etiology " suspected at this time

## 2024-02-19 NOTE — ED ATTENDING ATTESTATION
2/18/2024  I, Bud Liu MD, saw and evaluated the patient. I have discussed the patient with the resident/non-physician practitioner and agree with the resident's/non-physician practitioner's findings, Plan of Care, and MDM as documented in the resident's/non-physician practitioner's note, except where noted. All available labs and Radiology studies were reviewed.  I was present for key portions of any procedure(s) performed by the resident/non-physician practitioner and I was immediately available to provide assistance.       At this point I agree with the current assessment done in the Emergency Department.  I have conducted an independent evaluation of this patient a history and physical is as follows:    18-year-old otherwise healthy male presents to the emergency department for evaluation of swelling in bilateral upper extremities right greater than left.  Symptoms started a few days ago and have progressively worsened which prompted his evaluation.  He denies any pain.  He does state that he started going back to the gym, but only worked out for 30 to 45 minutes and this was not in excess of what he typically does.  No fevers or chills.  No trauma or falls.  No overlying skin changes.  No numbness or tingling.  Range of motion slightly limited secondary to swelling.  He also noted that his urine has been darker than usual.  No dysuria.    On exam, patient was comfortably bed in no acute distress, head is normocephalic atraumatic, pupils equal round reactive to light, neck is supple without meningismus signs, heart is regular rate and rhythm with intact distal pulses, no increased work of breathing, respiratory distress, or stridor.  Abdomen is soft, nontender nondistended without rebound or guarding.  Patient has significant swelling in bilateral upper extremities right greater than left, no tenderness, crepitus, or pain out of proportion.  No overlying skin changes.  Intact sensation and pulses.   Compartments are soft.    Suspect symptoms likely secondary to rhabdomyolysis, there are no signs of infectious process or compartment syndrome.  Will check labs including CK and treat with fluids.    Labs remarkable for leukocytosis which is likely reactive, no indication of infection.  Patient CK significantly elevated as well as elevated LFTs.  Case was discussed with medicine who will admit to their service for further management and evaluation.    ED Course  ED Course as of 02/19/24 0122   Sun Feb 18, 2024   2257 Blood, UA(!): Large   2257 RBC, UA: None Seen   Mon Feb 19, 2024   0033 Total CK(!): 60,550         Critical Care Time  Procedures

## 2024-02-19 NOTE — PROGRESS NOTES
Progress Note - Michael Chan 18 y.o. male MRN: [id number]    Unit/Bed#: ED 10 Encounter: [id number]      Assessment:  Michael Chan is a 18 y.o. male with bilateral UE swelling and rhabdo    CK 32956 from [id number]    Plan:  Continue Q1hr neurovascular checks  Informed patient of alarm symptoms  Trend CK and continue IVF    Subjective:   Patient seen and examined bedside.  No symptom development.    Objective:     Vitals: Blood pressure 138/78, pulse 88, temperature 98.1 °F (36.7 °C), resp. rate 16, SpO2 98%.,There is no height or weight on file to calculate BMI.      Intake/Output Summary (Last 24 hours) at 2/19/2024 0944  Last data filed at 2/19/2024 0744  Gross per 24 hour   Intake 2000 ml   Output 1900 ml   Net 100 ml       Physical Exam:   General - no acute distress, responsive and cooperative  CV - warm, regular rate  Pulm - normal work of breathing, no respiratory distress  Neuro - m/s grossly intact, cn grossly intact  Ext - moving all extremities, bilateral UE swelling without tenderness, full ROM, no skin changes, sensation intact with palpable radial pulses       Invasive Devices       Peripheral Intravenous Line  Duration             Peripheral IV 02/18/24 Distal;Left;Upper;Ventral (anterior) Arm 1 day    Peripheral IV 02/19/24 Dorsal (posterior);Left Hand <1 day                    Lab, Imaging and other studies: I have personally reviewed pertinent reports.

## 2024-02-20 ENCOUNTER — APPOINTMENT (INPATIENT)
Dept: RADIOLOGY | Facility: HOSPITAL | Age: 19
DRG: 558 | End: 2024-02-20
Payer: COMMERCIAL

## 2024-02-20 PROBLEM — E83.42 HYPOMAGNESEMIA: Status: ACTIVE | Noted: 2024-02-20

## 2024-02-20 LAB
ANION GAP SERPL CALCULATED.3IONS-SCNC: 3 MMOL/L
ATRIAL RATE: 53 BPM
BASOPHILS # BLD AUTO: 0.06 THOUSANDS/ÂΜL (ref 0–0.1)
BASOPHILS NFR BLD AUTO: 1 % (ref 0–1)
BUN SERPL-MCNC: 7 MG/DL (ref 5–25)
CALCIUM SERPL-MCNC: 8.8 MG/DL (ref 8.4–10.2)
CHLORIDE SERPL-SCNC: 106 MMOL/L (ref 96–108)
CK SERPL-CCNC: ABNORMAL U/L (ref 39–308)
CK SERPL-CCNC: ABNORMAL U/L (ref 39–308)
CO2 SERPL-SCNC: 29 MMOL/L (ref 21–32)
CREAT SERPL-MCNC: 0.65 MG/DL (ref 0.6–1.3)
EOSINOPHIL # BLD AUTO: 0.13 THOUSAND/ÂΜL (ref 0–0.61)
EOSINOPHIL NFR BLD AUTO: 2 % (ref 0–6)
ERYTHROCYTE [DISTWIDTH] IN BLOOD BY AUTOMATED COUNT: 13.2 % (ref 11.6–15.1)
GFR SERPL CREATININE-BSD FRML MDRD: 141 ML/MIN/1.73SQ M
GLUCOSE SERPL-MCNC: 94 MG/DL (ref 65–140)
HCT VFR BLD AUTO: 37.7 % (ref 36.5–49.3)
HGB BLD-MCNC: 12.9 G/DL (ref 12–17)
IMM GRANULOCYTES # BLD AUTO: 0.03 THOUSAND/UL (ref 0–0.2)
IMM GRANULOCYTES NFR BLD AUTO: 0 % (ref 0–2)
LYMPHOCYTES # BLD AUTO: 1.95 THOUSANDS/ÂΜL (ref 0.6–4.47)
LYMPHOCYTES NFR BLD AUTO: 23 % (ref 14–44)
MAGNESIUM SERPL-MCNC: 1.6 MG/DL (ref 1.9–2.7)
MCH RBC QN AUTO: 30.9 PG (ref 26.8–34.3)
MCHC RBC AUTO-ENTMCNC: 34.2 G/DL (ref 31.4–37.4)
MCV RBC AUTO: 90 FL (ref 82–98)
MONOCYTES # BLD AUTO: 0.69 THOUSAND/ÂΜL (ref 0.17–1.22)
MONOCYTES NFR BLD AUTO: 8 % (ref 4–12)
NEUTROPHILS # BLD AUTO: 5.82 THOUSANDS/ÂΜL (ref 1.85–7.62)
NEUTS SEG NFR BLD AUTO: 66 % (ref 43–75)
NRBC BLD AUTO-RTO: 0 /100 WBCS
P AXIS: 51 DEGREES
PLATELET # BLD AUTO: 248 THOUSANDS/UL (ref 149–390)
PMV BLD AUTO: 9.9 FL (ref 8.9–12.7)
POTASSIUM SERPL-SCNC: 3.8 MMOL/L (ref 3.5–5.3)
PR INTERVAL: 146 MS
QRS AXIS: 89 DEGREES
QRSD INTERVAL: 90 MS
QT INTERVAL: 418 MS
QTC INTERVAL: 392 MS
RBC # BLD AUTO: 4.18 MILLION/UL (ref 3.88–5.62)
SODIUM SERPL-SCNC: 138 MMOL/L (ref 135–147)
T WAVE AXIS: 50 DEGREES
VENTRICULAR RATE: 53 BPM
WBC # BLD AUTO: 8.68 THOUSAND/UL (ref 4.31–10.16)

## 2024-02-20 PROCEDURE — 82550 ASSAY OF CK (CPK): CPT | Performed by: PHYSICIAN ASSISTANT

## 2024-02-20 PROCEDURE — 93005 ELECTROCARDIOGRAM TRACING: CPT

## 2024-02-20 PROCEDURE — 99232 SBSQ HOSP IP/OBS MODERATE 35: CPT | Performed by: PHYSICIAN ASSISTANT

## 2024-02-20 PROCEDURE — 93010 ELECTROCARDIOGRAM REPORT: CPT | Performed by: INTERNAL MEDICINE

## 2024-02-20 PROCEDURE — 82550 ASSAY OF CK (CPK): CPT | Performed by: INTERNAL MEDICINE

## 2024-02-20 PROCEDURE — 85025 COMPLETE CBC W/AUTO DIFF WBC: CPT | Performed by: INTERNAL MEDICINE

## 2024-02-20 PROCEDURE — 83735 ASSAY OF MAGNESIUM: CPT | Performed by: INTERNAL MEDICINE

## 2024-02-20 PROCEDURE — 36415 COLL VENOUS BLD VENIPUNCTURE: CPT | Performed by: INTERNAL MEDICINE

## 2024-02-20 PROCEDURE — 99233 SBSQ HOSP IP/OBS HIGH 50: CPT | Performed by: STUDENT IN AN ORGANIZED HEALTH CARE EDUCATION/TRAINING PROGRAM

## 2024-02-20 PROCEDURE — 80048 BASIC METABOLIC PNL TOTAL CA: CPT | Performed by: INTERNAL MEDICINE

## 2024-02-20 PROCEDURE — 71045 X-RAY EXAM CHEST 1 VIEW: CPT

## 2024-02-20 PROCEDURE — 99232 SBSQ HOSP IP/OBS MODERATE 35: CPT | Performed by: INTERNAL MEDICINE

## 2024-02-20 RX ORDER — MAGNESIUM SULFATE HEPTAHYDRATE 40 MG/ML
2 INJECTION, SOLUTION INTRAVENOUS ONCE
Qty: 50 ML | Refills: 0 | Status: COMPLETED | OUTPATIENT
Start: 2024-02-20 | End: 2024-02-21

## 2024-02-20 RX ADMIN — SODIUM CHLORIDE, SODIUM LACTATE, POTASSIUM CHLORIDE, AND CALCIUM CHLORIDE 250 ML/HR: .6; .31; .03; .02 INJECTION, SOLUTION INTRAVENOUS at 01:30

## 2024-02-20 RX ADMIN — SODIUM CHLORIDE, SODIUM LACTATE, POTASSIUM CHLORIDE, AND CALCIUM CHLORIDE 500 ML/HR: .6; .31; .03; .02 INJECTION, SOLUTION INTRAVENOUS at 20:45

## 2024-02-20 RX ADMIN — SODIUM CHLORIDE, SODIUM LACTATE, POTASSIUM CHLORIDE, AND CALCIUM CHLORIDE 250 ML/HR: .6; .31; .03; .02 INJECTION, SOLUTION INTRAVENOUS at 06:31

## 2024-02-20 RX ADMIN — MAGNESIUM SULFATE HEPTAHYDRATE 2 G: 40 INJECTION, SOLUTION INTRAVENOUS at 08:57

## 2024-02-20 RX ADMIN — SODIUM CHLORIDE, SODIUM LACTATE, POTASSIUM CHLORIDE, AND CALCIUM CHLORIDE 500 ML/HR: .6; .31; .03; .02 INJECTION, SOLUTION INTRAVENOUS at 09:45

## 2024-02-20 RX ADMIN — SODIUM CHLORIDE, SODIUM LACTATE, POTASSIUM CHLORIDE, AND CALCIUM CHLORIDE 500 ML/HR: .6; .31; .03; .02 INJECTION, SOLUTION INTRAVENOUS at 18:29

## 2024-02-20 RX ADMIN — SODIUM CHLORIDE, SODIUM LACTATE, POTASSIUM CHLORIDE, AND CALCIUM CHLORIDE 500 ML/HR: .6; .31; .03; .02 INJECTION, SOLUTION INTRAVENOUS at 22:50

## 2024-02-20 RX ADMIN — SODIUM CHLORIDE, SODIUM LACTATE, POTASSIUM CHLORIDE, AND CALCIUM CHLORIDE 500 ML/HR: .6; .31; .03; .02 INJECTION, SOLUTION INTRAVENOUS at 14:04

## 2024-02-20 RX ADMIN — SODIUM CHLORIDE, SODIUM LACTATE, POTASSIUM CHLORIDE, AND CALCIUM CHLORIDE 500 ML/HR: .6; .31; .03; .02 INJECTION, SOLUTION INTRAVENOUS at 11:52

## 2024-02-20 RX ADMIN — SODIUM CHLORIDE, SODIUM LACTATE, POTASSIUM CHLORIDE, AND CALCIUM CHLORIDE 500 ML/HR: .6; .31; .03; .02 INJECTION, SOLUTION INTRAVENOUS at 16:03

## 2024-02-20 NOTE — PROGRESS NOTES
NEPHROLOGY PROGRESS NOTE   Michael Chan 18 y.o. male MRN: [id number]  Unit/Bed#: [facility] 816-01 Encounter: [id number]  Reason for Consult: Rhabdomyolysis    ASSESSMENT/PLAN:  Rhabdomyolysis- secondary to exercise induced  Agree with high dose IVF  CXR normal, volume status acceptable  Renal function acceptable and normal  Good urine output, monitor   UO = 9.5L yesterday  CPK worse today  Increase IVF back to 500ml/hr  Hypokalemia- replete as needed and monitor   Improved   Hypomagnesemia- replete as needed and monitor   Elevated liver enzymes- per primary team   Recommend CMP in AM    Disposition:  Increase IVF to 500ml/hr.  Trend urine output.  Trend creatinine.      SUBJECTIVE:  Patient denies SOB.  He denies pain.  He is feeling well overall.  Ate dinner last night.      OBJECTIVE:  Current Weight:    Vitals:    02/20/24 0645 02/20/24 0800 02/20/24 1000 02/20/24 1050   BP:  120/73 124/73 129/79   BP Location:  Left arm Left arm    Pulse: (!) 51 69 71 69   Resp: 16 19 (!) 26 16   Temp:    98 °F (36.7 °C)   TempSrc:       SpO2: 97% 98% 98% 98%       Intake/Output Summary (Last 24 hours) at 2/20/2024 1104  Last data filed at 2/20/2024 1001  Gross per 24 hour   Intake 1000 ml   Output 9870 ml   Net -8870 ml     General: NAD  Skin: no rash  Eyes: anicteric  ENMT: mm moist  Neck: no masses  Respiratory: CTAB  Cardiac: RRR  Extremities: no LE edema  GI: soft nt nd  Neuro: alert awake  Psych: mood and affect appropriate    Medications:    Current Facility-Administered Medications:     heparin (porcine) subcutaneous injection 5,000 Units, 5,000 Units, Subcutaneous, Q8H DENILSONOliverio DO    lactated ringers infusion, 500 mL/hr, Intravenous, Continuous, Messi Moss PA-C, Last Rate: 500 mL/hr at 02/20/24 0945, 500 mL/hr at 02/20/24 0945    Laboratory Results:  Results from last 7 days   Lab Units 02/20/24  0420 02/19/24  0433 02/19/24  0207 02/18/24  2233   WBC Thousand/uL 8.68 10.07  --  14.45*   HEMOGLOBIN g/dL 12.9  12.8  --  15.2   HEMATOCRIT % 37.7 36.7  --  42.0   PLATELETS Thousands/uL 248 240  --  307   POTASSIUM mmol/L 3.8 3.3*  --  3.7   CHLORIDE mmol/L 106 106  --  104   CO2 mmol/L 29 25  --  25   BUN mg/dL 7 13  --  21   CREATININE mg/dL 0.65 0.65  --  0.80   CALCIUM mg/dL 8.8 7.8*  --  8.8   MAGNESIUM mg/dL 1.6* 1.9  --   --    PHOSPHORUS mg/dL  --   --  3.2  --      I have personally reviewed the blood work as stated above and in my note.  I have personally reviewed CXR imaging studies.  I have personally reviewed slim and surgery note.

## 2024-02-20 NOTE — PROGRESS NOTES
"[facility]  Progress Note  Name: Michael Chan I  MRN: [id number]  Unit/Bed#: ED 10 I Date of Admission: 2/18/2024   Date of Service: 2/20/2024 I Hospital Day: 1    Assessment/Plan   * Rhabdomyolysis  Assessment & Plan  Reported bilateral upper extremity \"tightness\" that began about 8 hours after he exercised at the [employer] gym this past Thursday; reported waking up the next morning and noticing that both of his arms were significantly swollen although not painful.  Reported that swelling worsened prompting him to come to the ER.  Patient reported that he did use weights including performing dumbbell bicep curls as well as bench press; reported that last time that he worked out similar to this was in November.  Denied that the workout was far in excess of what he has done in the past and reported that he was at the gym about 45 minutes  CK significantly elevated: 60,550 --> 36,550 --> 73,400  Discussed with Nephrology today. Increased LR rate back to 500cc/hr, check Q6hr CK levels, and I&Os ordered  Creatinine remains within normal limits  Noted mild leukocytosis of 14.0 on admission, resolved, no clear infectious etiology identified and being monitored off antibiotics at this time   Given patient's tense swollen bilateral upper extremities, General Surgery evaluated due to concern for compartment syndrome - no acute surgical intervention planned  Bilateral venous ultrasounds of the upper extremities was Negative for DVT per the results report    Swelling of arm  Assessment & Plan  See rhabdo section above    Hypokalemia  Assessment & Plan  Resolved with repletion, monitor       Hypomagnesemia  Assessment & Plan  Mag 1.6, replete and monitor           VTE Pharmacologic Prophylaxis: VTE Score: 2 Low Risk (Score 0-2) - Encourage Ambulation.    Mobility:      HLM Goal achieved. Continue to encourage appropriate mobility.    Patient Centered Rounds: I performed bedside " rounds with nursing staff today.  ED RN  Discussions with Specialists or Other Care Team Provider: Nephrology attending and Nephrology AP via TT    Education and Discussions with Family / Patient: Patient declined call to .     Total Time Spent on Date of Encounter in care of patient: 35 mins. This time was spent on one or more of the following: performing physical exam; counseling and coordination of care; obtaining or reviewing history; documenting in the medical record; reviewing/ordering tests, medications or procedures; communicating with other healthcare professionals and discussing with patient's family/caregivers.    Current Length of Stay: 1 day(s)  Current Patient Status: Inpatient   Certification Statement: The patient will continue to require additional inpatient hospital stay due to Rhabdo, elevated CK, increasing IVF   Discharge Plan:  pending improvement in CK and Nephrology clearance    Code Status: Level 1 - Full Code    Subjective:   Mr. Chan reports that his arms still feel swollen and tight and sore, but the same as before - no better and no worse.     Objective:     Vitals:   Temp (24hrs), Av.7 °F (37.1 °C), Min:98.7 °F (37.1 °C), Max:98.7 °F (37.1 °C)    Temp:  [98.7 °F (37.1 °C)] 98.7 °F (37.1 °C)  HR:  [51-89] 69  Resp:  [13-20] 19  BP: (111-139)/(55-73) 120/73  SpO2:  [97 %-98 %] 98 %  There is no height or weight on file to calculate BMI.     Input and Output Summary (last 24 hours):     Intake/Output Summary (Last 24 hours) at 2024 0840  Last data filed at 2024 0632  Gross per 24 hour   Intake 1000 ml   Output 9220 ml   Net -8220 ml       Physical Exam:   Physical Exam  Vitals reviewed.   Constitutional:       Comments: Patient seen lying in ED bed, NAD   Cardiovascular:      Rate and Rhythm: Normal rate and regular rhythm.   Pulmonary:      Effort: Pulmonary effort is normal. No respiratory distress.      Breath sounds: Normal breath sounds.   Abdominal:       General: Bowel sounds are normal.      Palpations: Abdomen is soft.      Tenderness: There is no abdominal tenderness.   Musculoskeletal:         General: Swelling present.   Skin:     General: Skin is warm.   Neurological:      Mental Status: He is alert and oriented to person, place, and time.   Psychiatric:         Mood and Affect: Mood normal.         Behavior: Behavior normal.          Additional Data:     Labs:  Results from last 7 days   Lab Units 02/20/24  0420   WBC Thousand/uL 8.68   HEMOGLOBIN g/dL 12.9   HEMATOCRIT % 37.7   PLATELETS Thousands/uL 248   NEUTROS PCT % 66   LYMPHS PCT % 23   MONOS PCT % 8   EOS PCT % 2     Results from last 7 days   Lab Units 02/20/24  0420 02/19/24  0433   SODIUM mmol/L 138 138   POTASSIUM mmol/L 3.8 3.3*   CHLORIDE mmol/L 106 106   CO2 mmol/L 29 25   BUN mg/dL 7 13   CREATININE mg/dL 0.65 0.65   ANION GAP mmol/L 3 7   CALCIUM mg/dL 8.8 7.8*   ALBUMIN g/dL  --  3.4*   TOTAL BILIRUBIN mg/dL  --  0.41   ALK PHOS U/L  --  44   ALT U/L  --  185*   AST U/L  --  693*   GLUCOSE RANDOM mg/dL 94 91     Results from last 7 days   Lab Units 02/19/24  0433   INR  1.17                   Lines/Drains:  Invasive Devices       Peripheral Intravenous Line  Duration             Peripheral IV 02/19/24 Dorsal (posterior);Left Hand 1 day    Peripheral IV 02/20/24 Right;Ventral (anterior) Hand <1 day                      Telemetry:  Telemetry Orders (From admission, onward)               24 Hour Telemetry Monitoring  Continuous x 24 Hours (Telem)        Question:  Reason for 24 Hour Telemetry  Answer:  Metabolic/electrolyte disturbance with high probability of dysrhythmia. K level <3 or >6 OR KCL infusion >10mEq/hr                     Telemetry Reviewed: Normal Sinus Rhythm, Sinus Bradycardia, and Sinus Tachycardia  Indication for Continued Telemetry Use: Arrthymias requiring medical therapy             Imaging: Reviewed radiology reports from this admission including: chest xray and  ultrasound(s)    Recent Cultures (last 7 days):   Results from last 7 days   Lab Units 02/19/24  0211 02/19/24  0207   BLOOD CULTURE  No Growth at 24 hrs. No Growth at 24 hrs.       Last 24 Hours Medication List:   Current Facility-Administered Medications   Medication Dose Route Frequency Provider Last Rate    heparin (porcine)  5,000 Units Subcutaneous Q8H DENILSON Chan DO      lactated ringers  500 mL/hr Intravenous Continuous Messi Moss PA-C 250 mL/hr (02/20/24 0631)    magnesium sulfate  2 g Intravenous Once Messi Moss PA-C          Today, Patient Was Seen By: Messi Moss PA-C    **Please Note: This note may have been constructed using a voice recognition system.**

## 2024-02-20 NOTE — PLAN OF CARE
Problem: PAIN - ADULT  Goal: Verbalizes/displays adequate comfort level or baseline comfort level  Description: Interventions:  - Encourage patient to monitor pain and request assistance  - Assess pain using appropriate pain scale  - Administer analgesics based on type and severity of pain and evaluate response  - Implement non-pharmacological measures as appropriate and evaluate response  - Consider cultural and social influences on pain and pain management  - Notify physician/advanced practitioner if interventions unsuccessful or patient reports new pain  Outcome: Progressing     Problem: INFECTION - ADULT  Goal: Absence or prevention of progression during hospitalization  Description: INTERVENTIONS:  - Assess and monitor for signs and symptoms of infection  - Monitor lab/diagnostic results  - Monitor all insertion sites, i.e. indwelling lines, tubes, and drains  - Monitor endotracheal if appropriate and nasal secretions for changes in amount and color  - [place] appropriate cooling/warming therapies per order  - Administer medications as ordered  - Instruct and encourage patient and family to use good hand hygiene technique  - Identify and instruct in appropriate isolation precautions for identified infection/condition  Outcome: Progressing  Goal: Absence of fever/infection during neutropenic period  Description: INTERVENTIONS:  - Monitor WBC    Outcome: Progressing     Problem: SAFETY ADULT  Goal: Patient will remain free of falls  Description: INTERVENTIONS:  - Educate patient/family on patient safety including physical limitations  - Instruct patient to call for assistance with activity   - Consult OT/PT to assist with strengthening/mobility   - Keep Call bell within reach  - Keep bed low and locked with side rails adjusted as appropriate  - Keep care items and personal belongings within reach  - Initiate and maintain comfort rounds  - Make Fall Risk Sign visible to staff  - Apply yellow socks and bracelet  for high fall risk patients  - Consider moving patient to room near nurses station  Outcome: Progressing  Goal: Maintain or return to baseline ADL function  Description: INTERVENTIONS:  -  Assess patient's ability to carry out ADLs; assess patient's baseline for ADL function and identify physical deficits which impact ability to perform ADLs (bathing, care of mouth/teeth, toileting, grooming, dressing, etc.)  - Assess/evaluate cause of self-care deficits   - Assess range of motion  - Assess patient's mobility; develop plan if impaired  - Assess patient's need for assistive devices and provide as appropriate  - Encourage maximum independence but intervene and supervise when necessary  - Involve family in performance of ADLs  - Assess for home care needs following discharge   - Consider OT consult to assist with ADL evaluation and planning for discharge  - Provide patient education as appropriate  Outcome: Progressing  Goal: Maintains/Returns to pre admission functional level  Description: INTERVENTIONS:  - Perform AM-PAC 6 Click Basic Mobility/ Daily Activity assessment daily.  - Set and communicate daily mobility goal to care team and patient/family/caregiver.   - Collaborate with rehabilitation services on mobility goals if consulted  - Perform Range of Motion 3 times a day.  - Reposition patient every 2 hours.  - Dangle patient 3 times a day  - Stand patient 3 times a day  - Ambulate patient 3 times a day  - Out of bed to chair 3 times a day   - Out of bed for meals 3 times a day  - Out of bed for toileting  - Record patient progress and toleration of activity level   Outcome: Progressing     Problem: DISCHARGE PLANNING  Goal: Discharge to home or other facility with appropriate resources  Description: INTERVENTIONS:  - Identify barriers to discharge w/patient and caregiver  - Arrange for needed discharge resources and transportation as appropriate  - Identify discharge learning needs (meds, wound care, etc.)  -  Arrange for interpretive services to assist at discharge as needed  - Refer to Case Management Department for coordinating discharge planning if the patient needs post-hospital services based on physician/advanced practitioner order or complex needs related to functional status, cognitive ability, or social support system  Outcome: Progressing     Problem: Knowledge Deficit  Goal: Patient/family/caregiver demonstrates understanding of disease process, treatment plan, medications, and discharge instructions  Description: Complete learning assessment and assess knowledge base.  Interventions:  - Provide teaching at level of understanding  - Provide teaching via preferred learning methods  Outcome: Progressing

## 2024-02-20 NOTE — ASSESSMENT & PLAN NOTE
"Reported bilateral upper extremity \"tightness\" that began about 8 hours after he exercised at the [employer] gym this past Thursday; reported waking up the next morning and noticing that both of his arms were significantly swollen although not painful.  Reported that swelling worsened prompting him to come to the ER.  Patient reported that he did use weights including performing dumbbell bicep curls as well as bench press; reported that last time that he worked out similar to this was in November.  Denied that the workout was far in excess of what he has done in the past and reported that he was at the gym about 45 minutes  CK significantly elevated: 60,550 --> 36,550 --> 73,400  Discussed with Nephrology today. Increased LR rate back to 500cc/hr, check Q6hr CK levels, and I&Os ordered  Creatinine remains within normal limits  Noted mild leukocytosis of 14.0 on admission, resolved, no clear infectious etiology identified and being monitored off antibiotics at this time   Given patient's tense swollen bilateral upper extremities, General Surgery evaluated due to concern for compartment syndrome - no acute surgical intervention planned  Bilateral venous ultrasounds of the upper extremities was Negative for DVT per the results report  "

## 2024-02-20 NOTE — PROGRESS NOTES
Progress Note - Michael Chan 18 y.o. male MRN: [id number]    Unit/Bed#: ED 10 Encounter: [id number]      Assessment:  Michael Chan is a 18 y.o. male with bilateral UE swelling and rhabdo  No clinical sx compartment syndrome      Lab Results   Component Value Date    CKTOTAL 73,400 (H) 02/20/2024   [place] [address]; 234[address]    Lab Results   Component Value Date    CREATININE 0.65 02/20/2024         UOP 9.5L    Plan:  Continue Q1hr neurovascular checks  Informed patient of alarm symptoms  Trend CK and continue IVF  Appreciate Nephro recs    Subjective:   Patient seen and examined bedside.  No interval symptom development    Objective:     Vitals: Blood pressure 120/73, pulse 69, temperature 98.7 °F (37.1 °C), temperature source Oral, resp. rate 19, SpO2 98%.,There is no height or weight on file to calculate BMI.      Intake/Output Summary (Last 24 hours) at 2/20/2024 0914  Last data filed at 2/20/2024 0632  Gross per 24 hour   Intake 1000 ml   Output 9220 ml   Net -8220 ml       Physical Exam:   General - no acute distress, responsive and cooperative  CV - warm, regular rate  Pulm - normal work of breathing, no respiratory distress  Neuro - m/s grossly intact, cn grossly intact  Ext - moving all extremities, bilateral UE swelling without tenderness, full ROM, no skin changes, motor and sensation intact with palpable radial pulses bilaterally       Invasive Devices       Peripheral Intravenous Line  Duration             Peripheral IV 02/19/24 Dorsal (posterior);Left Hand 1 day    Peripheral IV 02/20/24 Right;Ventral (anterior) Hand <1 day                    Lab, Imaging and other studies: I have personally reviewed pertinent reports.

## 2024-02-20 NOTE — UTILIZATION REVIEW
Initial Clinical Review    Admission: Date/Time/Statement:   Admission Orders (From admission, onward)       Ordered        02/19/24 0048  INPATIENT ADMISSION  Once                          Orders Placed This Encounter   Procedures    INPATIENT ADMISSION     Standing Status:   Standing     Number of Occurrences:   1     Order Specific Question:   Level of Care     Answer:   Med Surg [16]     Order Specific Question:   Estimated length of stay     Answer:   More than 2 Midnights     Order Specific Question:   Certification     Answer:   I certify that inpatient services are medically necessary for this patient for a duration of greater than two midnights. See H&P and MD Progress Notes for additional information about the patient's course of treatment.     ED Arrival Information       Expected   -    Arrival   2/18/2024 22:05    Acuity   Emergent              Means of arrival   Walk-In    Escorted by   Self    Service   Hospitalist    Admission type   Emergency              Arrival complaint   Swelling             Chief Complaint   Patient presents with    Arm Swelling     PT c/o B/L arm swelling. Right arm worse than left. PT states it may have happened after working out. Right arm hard to touch, significant swelling noted. Denies numbness.        Initial Presentation: 18 y.o. male to the ED from home with complaints of bilateral arm swelling, right arm worse than left. Admitted to CRITICAL CARE step down for rhabdomyolysis.  He exercised for 45 minutes 2/15 at the gym.  CPK 60,000 in the ED with elevated ast/alt. Creat wnl.  Mildly elevated WBCs.  Given 2 liters iv fluids in the ED. Monitor tele. Gen/surg consult.  Monitor tele. Trend CPK.  Often lifts weights.      Date:    2/19 Day 2:  Denies any iv drugs.Has been eating normally at home.  Swelling bilateral arms improving.     Gen/surg Consult: CK trending down.  Continue IV fluids.  Continue with hourly neurovascular checks. Trend CK. NO clinical signs of  rhabdo.     Nephrology consult:  Admits to taking 2 tablets of advil for the past 2 days.  Working out for 30-40 minutes between classes.  Continue high rate iv fluids, trend CK.  CK already trending down.  Follow up ck level.     2/20 Update: Asa per discussion with Nephrology, CPK increasing.  INcrease IV fluids, check CK level every 6 hours.  Creat remains WNL.  Venous US WNL.     Gen/surg:  Continue with IV fluids, monitor urine output and monitor CK.  No signs of compartment syndrome. Continue hourly neurovascular checks. Trend CK .   ED Triage Vitals   Temperature Pulse Respirations Blood Pressure SpO2   02/18/24 2209 02/18/24 2209 02/18/24 2209 02/18/24 2211 02/18/24 2209   98.1 °F (36.7 °C) (!) 124 18 141/88 95 %      Temp Source Heart Rate Source Patient Position - Orthostatic VS BP Location FiO2 (%)   02/20/24 0000 02/18/24 2300 02/18/24 2300 02/18/24 2300 --   Oral Monitor Sitting Right arm       Pain Score       02/19/24 0445       No Pain          Wt Readings from Last 1 Encounters:   No data found for Wt     Additional Vital Signs: Vital Signs (last 2 days)    Date/Time Temp Pulse Resp BP MAP (mmHg) SpO2 O2 Device Patient Position - Orthostatic VS   02/20/24 1000 -- 71 26 Abnormal  124/73 94 98 % None (Room air) Lying   02/20/24 0800 -- 69 19 120/73 91 98 % None (Room air) Lying   02/20/24 0645 -- 51 Abnormal  16 -- -- 97 % -- --   02/20/24 0600 -- 54 Abnormal  13 114/64 82 97 % None (Room air) Lying   02/20/24 0200 -- 54 Abnormal  15 111/55 76 97 % -- --   02/20/24 0000 98.7 °F (37.1 °C) 62 20 125/61 88 98 % None (Room air) Sitting   02/19/24 2115 -- 89 18 128/67 91 97 % None (Room air) Sitting   02/19/24 1000 -- 83 18 139/67 96 98 % None (Room air) Lying   02/19/24 0738 -- 88 16 138/78 100 98 % None (Room air) Lying   02/19/24 0445 -- 62 14 141/94 105 97 % None (Room air) Lying   02/19/24 0245 -- 81 17 129/69 92 96 % None (Room air) Lying   02/19/24 0030 -- 84 18 147/80 105 98 % None (Room air)  Sitting   02/19/24 0000 -- 85 16 133/68 94 98 % None (Room air) Sitting   02/18/24 2330 -- 88 16 131/63 91 98 % None (Room air) Sitting   02/18/24 2300 -- 101 16 160/73 105 98 % None (Room air) Sitting   02/18/24 2211 -- -- -- 141/88 -- -- -- --   02/18/24 2209 98.1 °F (36.7 °C) 124 Abnormal  18 -- -- 95 % None (Room air) --     Pertinent Labs/Diagnostic Test Results:     XR chest portable   Final Result by Kris Crowder MD (02/20 0843)      No active pulmonary disease.            Workstation performed: [id number]         VAS upper limb venous duplex scan, complete, bilateral   2/19:  RIGHT UPPER LIMB:  No evidence of acute or chronic deep vein thrombosis.  No evidence of superficial thrombophlebitis noted.  Doppler evaluation shows a normal response to augmentation maneuvers.     LEFT UPPER LIMB:  No evidence of acute or chronic deep vein thrombosis.  There is evidence of subacute non-occlusive superficial thrombophlebitis in the  basilic vein at the proximal upper arm.  Doppler evaluation shows a normal response to augmentation maneuvers.        Results from last 7 days   Lab Units 02/19/24  0207   SARS-COV-2  Negative     Results from last 7 days   Lab Units 02/20/24  0420 02/19/24  0433 02/18/24  2233   WBC Thousand/uL 8.68 10.07 14.45*   HEMOGLOBIN g/dL 12.9 12.8 15.2   HEMATOCRIT % 37.7 36.7 42.0   PLATELETS Thousands/uL 248 240 307   NEUTROS ABS Thousands/µL 5.82 6.99 11.62*         Results from last 7 days   Lab Units 02/20/24  0420 02/19/24  0433 02/19/24  0207 02/18/24  2233   SODIUM mmol/L 138 138  --  138   POTASSIUM mmol/L 3.8 3.3*  --  3.7   CHLORIDE mmol/L 106 106  --  104   CO2 mmol/L 29 25  --  25   ANION GAP mmol/L 3 7  --  9   BUN mg/dL 7 13  --  21   CREATININE mg/dL 0.65 0.65  --  0.80   EGFR ml/min/1.73sq m 141 141  --  129   CALCIUM mg/dL 8.8 7.8*  --  8.8   MAGNESIUM mg/dL 1.6* 1.9  --   --    PHOSPHORUS mg/dL  --   --  3.2  --      Results from last 7 days   Lab Units 02/19/24  5024  02/18/24 2233   AST U/L 693* 984*   ALT U/L 185* 238*   ALK PHOS U/L 44 55   TOTAL PROTEIN g/dL 5.1* 6.4   ALBUMIN g/dL 3.4* 4.2   TOTAL BILIRUBIN mg/dL 0.41 0.71         Results from last 7 days   Lab Units 02/20/24  0420 02/19/24  0433 02/18/24  2233   GLUCOSE RANDOM mg/dL 94 91 121       Results from last 7 days   Lab Units 02/20/24  0420 02/19/24  0433 02/18/24  2233   CK TOTAL U/L 73,400* 36,550* 60,550*             Results from last 7 days   Lab Units 02/19/24  0433 02/19/24  0207   PROTIME seconds 14.8* 14.5   INR  1.17 1.14   PTT seconds 29 30     Results from last 7 days   Lab Units 02/19/24  0207   TSH 3RD GENERATON uIU/mL 1.922     Results from last 7 days   Lab Units 02/19/24  0207   CRP mg/L 10.4*   SED RATE mm/hour 1       Results from last 7 days   Lab Units 02/18/24 2239   CLARITY UA  Clear   COLOR UA  Light Orange   SPEC GRAV UA  1.026   PH UA  6.0   GLUCOSE UA mg/dl Negative   KETONES UA mg/dl 80 (3+)*   BLOOD UA  Large*   PROTEIN UA mg/dl 100 (2+)*   NITRITE UA  Negative   BILIRUBIN UA  Negative   UROBILINOGEN UA (BE) mg/dl 2.0*   LEUKOCYTES UA  Negative   WBC UA /hpf 1-2   RBC UA /hpf None Seen   BACTERIA UA /hpf None Seen   EPITHELIAL CELLS WET PREP /hpf None Seen   MUCUS THREADS  Occasional*     Results from last 7 days   Lab Units 02/19/24  0207   INFLUENZA A PCR  Negative   INFLUENZA B PCR  Negative   RSV PCR  Negative         Results from last 7 days   Lab Units 02/19/24  1000   AMPH/METH  Negative   BARBITURATE UR  Negative   BENZODIAZEPINE UR  Negative   COCAINE UR  Negative   METHADONE URINE  Negative   OPIATE UR  Negative   PCP UR  Negative   THC UR  Negative       Results from last 7 days   Lab Units 02/19/24  0211 02/19/24  0207   BLOOD CULTURE  No Growth at 24 hrs. No Growth at 24 hrs.     ED Treatment:   Medication Administration from 02/18/2024 2205 to 02/20/2024 1006         Date/Time Order Dose Route Action Comments     02/18/2024 2249 EST multi-electrolyte (ISOLYTE-S PH 7.4)  bolus 1,000 mL 1,000 mL Intravenous New Bag --     02/19/2024 0036 EST multi-electrolyte (ISOLYTE-S PH 7.4) bolus 1,000 mL 1,000 mL Intravenous New Bag --     02/19/2024 1911 EST lactated ringers infusion 500 mL/hr Intravenous New Bag --     02/19/2024 1710 EST lactated ringers infusion 500 mL/hr Intravenous New Bag --     02/19/2024 1511 EST lactated ringers infusion 500 mL/hr Intravenous New Bag --     02/19/2024 1254 EST lactated ringers infusion 500 mL/hr Intravenous New Bag --     02/19/2024 1049 EST lactated ringers infusion 500 mL/hr Intravenous New Bag --     02/19/2024 0845 EST lactated ringers infusion 500 mL/hr Intravenous New Bag --     02/19/2024 0640 EST lactated ringers infusion 500 mL/hr Intravenous New Bag --     02/19/2024 0428 EST lactated ringers infusion 500 mL/hr Intravenous New Bag --     02/19/2024 0234 EST lactated ringers infusion 500 mL/hr Intravenous New Bag --     02/19/2024 0735 EST potassium chloride (Klor-Con M20) CR tablet 40 mEq 40 mEq Oral Given --     02/20/2024 0945 EST lactated ringers infusion 500 mL/hr Intravenous New Bag --     02/20/2024 0856 EST lactated ringers infusion 500 mL/hr Intravenous Rate/Dose Change --     02/20/2024 0631 EST lactated ringers infusion 250 mL/hr Intravenous New Bag --     02/20/2024 0130 EST lactated ringers infusion 250 mL/hr Intravenous New Bag --     02/19/2024 2219 EST lactated ringers infusion 250 mL/hr Intravenous New Bag --     02/19/2024 2115 EST lactated ringers infusion 250 mL/hr Intravenous New Bag --     02/20/2024 0857 EST magnesium sulfate 2 g/50 mL IVPB (premix) 2 g 2 g Intravenous New Bag --          Admitting Diagnosis: Arm swelling [M79.89]  Age/Sex: 18 y.o. male  Admission Orders:  UP and OOB  Tele  CK every 6 hours    Scheduled Medications:  heparin (porcine), 5,000 Units, Subcutaneous, Q8H DENILSON  magnesium sulfate, 2 g, Intravenous, Once      Continuous IV Infusions:  lactated ringers, 500 mL/hr, Intravenous, Continuous      PRN  Meds:       IP CONSULT TO ACUTE CARE SURGERY  IP CONSULT TO NEPHROLOGY    Network Utilization Review Department  ATTENTION: Please call with any questions or concerns to [phone] and carefully listen to the prompts so that you are directed to the right person. All voicemails are confidential.   For Discharge needs, contact Care Management DC Support Team at [phone] opt. 2  Send all requests for admission clinical reviews, approved or denied determinations and any other requests to dedicated fax number below belonging to the campus where the patient is receiving treatment. List of dedicated fax numbers for the Facilities:  FACILITY NAME UR FAX NUMBER   ADMISSION DENIALS (Administrative/Medical Necessity) [phone]   DISCHARGE SUPPORT TEAM (NETWORK) [phone]   PARENT CHILD HEALTH (Maternity/NICU/Pediatrics) [phone]   [facility] [phone]   [facility] [phone]   [facility] [phone]   [facility] [phone]   [facility] [phone]   [facility] [phone]   [facility] [phone]   [facility] [phone]   [facility] [phone]   [facility] [phone]   [facility] [phone]   [facility] [phone]

## 2024-02-20 NOTE — NURSING NOTE
"Still awaiting CK to result from 1200 (drawn on prior shift), this nurse called lab to enquire, per  \"the result is too high and it will not give a numeric number, so they keep running it.\" Made aware pt is on serial q6hr CK's as well. TRU Lopez PA TT and made aware of situation, also made aware pt called this nurse in d/t \"right upper abdominal tenderness with palpation.\" PA here to see pt, new orders given to redraw a new CK sample, done.  "

## 2024-02-20 NOTE — UTILIZATION REVIEW
NOTIFICATION OF INPATIENT ADMISSION      AUTHORIZATION REQUEST   SERVICING FACILITY:   [facility]  Address: [address] [place] [address]  Tax ID: [id number]  NPI: [id number] ATTENDING PROVIDER:  Attending Name and NPI#: Oliverio Chan Do [[id number]]  Address: [address] [place] [address]  Phone: [phone]   ADMISSION INFORMATION:  Place of Service: Inpatient [facility]  Place of Service Code: 21  Inpatient Admission Date/Time: 2/19/24 12:48 AM  Discharge Date/Time: No discharge date for patient encounter.  Admitting Diagnosis Code/Description:  Rhabdomyolysis [M62.82]  Arm swelling [M79.89]  Elevated LFTs [R79.89]     UTILIZATION REVIEW CONTACT:  Katarzyna Hardin Utilization   Network Utilization Review Department  Phone: [phone]  Fax: [phone]  Email: [email or web address]  Contact for approvals/pending authorizations, clinical reviews, and discharge.     PHYSICIAN ADVISORY SERVICES:  Medical Necessity Denial & Ezeb-fp-Stge Review  Phone: [phone]  Fax: [phone]  Email: [email or web address]     DISCHARGE SUPPORT TEAM:  For Patients Discharge Needs & Updates  Phone: [phone] opt. 2 Fax: [phone]  Email: [email or web address]

## 2024-02-21 LAB
ALBUMIN SERPL BCP-MCNC: 3.2 G/DL (ref 3.5–5)
ALP SERPL-CCNC: 41 U/L (ref 34–104)
ALT SERPL W P-5'-P-CCNC: 182 U/L (ref 7–52)
ANION GAP SERPL CALCULATED.3IONS-SCNC: 7 MMOL/L
AST SERPL W P-5'-P-CCNC: 559 U/L (ref 13–39)
BILIRUB SERPL-MCNC: 0.28 MG/DL (ref 0.2–1)
BUN SERPL-MCNC: 9 MG/DL (ref 5–25)
CALCIUM ALBUM COR SERPL-MCNC: 9.1 MG/DL (ref 8.3–10.1)
CALCIUM SERPL-MCNC: 8.5 MG/DL (ref 8.4–10.2)
CHLORIDE SERPL-SCNC: 106 MMOL/L (ref 96–108)
CK SERPL-CCNC: ABNORMAL U/L (ref 39–308)
CK SERPL-CCNC: ABNORMAL U/L (ref 39–308)
CO2 SERPL-SCNC: 26 MMOL/L (ref 21–32)
CREAT SERPL-MCNC: 0.56 MG/DL (ref 0.6–1.3)
ERYTHROCYTE [DISTWIDTH] IN BLOOD BY AUTOMATED COUNT: 13.1 % (ref 11.6–15.1)
GFR SERPL CREATININE-BSD FRML MDRD: 149 ML/MIN/1.73SQ M
GLUCOSE SERPL-MCNC: 101 MG/DL (ref 65–140)
GLUCOSE SERPL-MCNC: 121 MG/DL (ref 65–140)
HCT VFR BLD AUTO: 32.7 % (ref 36.5–49.3)
HGB BLD-MCNC: 11.7 G/DL (ref 12–17)
MAGNESIUM SERPL-MCNC: 1.7 MG/DL (ref 1.9–2.7)
MCH RBC QN AUTO: 31.9 PG (ref 26.8–34.3)
MCHC RBC AUTO-ENTMCNC: 35.8 G/DL (ref 31.4–37.4)
MCV RBC AUTO: 89 FL (ref 82–98)
PLATELET # BLD AUTO: 210 THOUSANDS/UL (ref 149–390)
PMV BLD AUTO: 10 FL (ref 8.9–12.7)
POTASSIUM SERPL-SCNC: 3.8 MMOL/L (ref 3.5–5.3)
PROT SERPL-MCNC: 5 G/DL (ref 6.4–8.4)
RBC # BLD AUTO: 3.67 MILLION/UL (ref 3.88–5.62)
SODIUM SERPL-SCNC: 139 MMOL/L (ref 135–147)
WBC # BLD AUTO: 7.49 THOUSAND/UL (ref 4.31–10.16)

## 2024-02-21 PROCEDURE — 82550 ASSAY OF CK (CPK): CPT | Performed by: INTERNAL MEDICINE

## 2024-02-21 PROCEDURE — 80053 COMPREHEN METABOLIC PANEL: CPT | Performed by: PHYSICIAN ASSISTANT

## 2024-02-21 PROCEDURE — 83735 ASSAY OF MAGNESIUM: CPT | Performed by: PHYSICIAN ASSISTANT

## 2024-02-21 PROCEDURE — 99232 SBSQ HOSP IP/OBS MODERATE 35: CPT | Performed by: INTERNAL MEDICINE

## 2024-02-21 PROCEDURE — 82948 REAGENT STRIP/BLOOD GLUCOSE: CPT

## 2024-02-21 PROCEDURE — 82550 ASSAY OF CK (CPK): CPT | Performed by: PHYSICIAN ASSISTANT

## 2024-02-21 PROCEDURE — 99232 SBSQ HOSP IP/OBS MODERATE 35: CPT | Performed by: PHYSICIAN ASSISTANT

## 2024-02-21 PROCEDURE — 85027 COMPLETE CBC AUTOMATED: CPT | Performed by: PHYSICIAN ASSISTANT

## 2024-02-21 RX ORDER — MAGNESIUM SULFATE HEPTAHYDRATE 40 MG/ML
2 INJECTION, SOLUTION INTRAVENOUS ONCE
Status: COMPLETED | OUTPATIENT
Start: 2024-02-21 | End: 2024-02-21

## 2024-02-21 RX ADMIN — SODIUM CHLORIDE, SODIUM LACTATE, POTASSIUM CHLORIDE, AND CALCIUM CHLORIDE 250 ML/HR: .6; .31; .03; .02 INJECTION, SOLUTION INTRAVENOUS at 19:14

## 2024-02-21 RX ADMIN — SODIUM CHLORIDE, SODIUM LACTATE, POTASSIUM CHLORIDE, AND CALCIUM CHLORIDE 500 ML/HR: .6; .31; .03; .02 INJECTION, SOLUTION INTRAVENOUS at 08:17

## 2024-02-21 RX ADMIN — SODIUM CHLORIDE, SODIUM LACTATE, POTASSIUM CHLORIDE, AND CALCIUM CHLORIDE 250 ML/HR: .6; .31; .03; .02 INJECTION, SOLUTION INTRAVENOUS at 22:52

## 2024-02-21 RX ADMIN — SODIUM CHLORIDE, SODIUM LACTATE, POTASSIUM CHLORIDE, AND CALCIUM CHLORIDE 250 ML/HR: .6; .31; .03; .02 INJECTION, SOLUTION INTRAVENOUS at 11:07

## 2024-02-21 RX ADMIN — SODIUM CHLORIDE, SODIUM LACTATE, POTASSIUM CHLORIDE, AND CALCIUM CHLORIDE 500 ML/HR: .6; .31; .03; .02 INJECTION, SOLUTION INTRAVENOUS at 04:48

## 2024-02-21 RX ADMIN — MAGNESIUM SULFATE HEPTAHYDRATE 2 G: 40 INJECTION, SOLUTION INTRAVENOUS at 11:44

## 2024-02-21 RX ADMIN — SODIUM CHLORIDE, SODIUM LACTATE, POTASSIUM CHLORIDE, AND CALCIUM CHLORIDE 250 ML/HR: .6; .31; .03; .02 INJECTION, SOLUTION INTRAVENOUS at 15:11

## 2024-02-21 RX ADMIN — SODIUM CHLORIDE, SODIUM LACTATE, POTASSIUM CHLORIDE, AND CALCIUM CHLORIDE 500 ML/HR: .6; .31; .03; .02 INJECTION, SOLUTION INTRAVENOUS at 03:07

## 2024-02-21 RX ADMIN — SODIUM CHLORIDE, SODIUM LACTATE, POTASSIUM CHLORIDE, AND CALCIUM CHLORIDE 500 ML/HR: .6; .31; .03; .02 INJECTION, SOLUTION INTRAVENOUS at 06:08

## 2024-02-21 RX ADMIN — SODIUM CHLORIDE, SODIUM LACTATE, POTASSIUM CHLORIDE, AND CALCIUM CHLORIDE 500 ML/HR: .6; .31; .03; .02 INJECTION, SOLUTION INTRAVENOUS at 01:11

## 2024-02-21 NOTE — NURSING NOTE
This nurse called the lab to enquire about repeat CK that was drawn at 1636 per direction of SLIM PA, per  the number is again too high and will not result, but they will attempt to run it again. Dr. Ramirez tt and made aware of situation, no new orders given at this time.

## 2024-02-21 NOTE — ASSESSMENT & PLAN NOTE
"Reported bilateral upper extremity \"tightness\" that began about 8 hours after he exercised at the [employer] gym this past Thursday; reported waking up the next morning and noticing that both of his arms were significantly swollen although not painful.  Reported that swelling worsened prompting him to come to the ER.  Patient reported that he did use weights including performing dumbbell bicep curls as well as bench press; reported that last time that he worked out similar to this was in November.  Denied that the workout was far in excess of what he has done in the past and reported that he was at the gym about 45 minutes  CK significantly elevated: 60,550, initially improved to 36,550, at which point IVF rate was decreased, however CK then elevated to 73,400 and IVF rate was increased again. Subsequent CKs have improved: 61,739 --> 30,152 and Nephrology recommended to decrease LR to 250cc/hr and decrease checking CK levels to Q8hr  Appreciate ongoing Nephrology recs  Creatinine remains within normal limits  Noted mild leukocytosis of 14.0 on admission, resolved, no clear infectious etiology identified and being monitored off antibiotics at this time   Given patient's tense swollen bilateral upper extremities, General Surgery evaluated due to concern for compartment syndrome - no acute surgical intervention planned  Bilateral venous ultrasounds of the upper extremities was Negative for DVT per the results report  "

## 2024-02-21 NOTE — PROGRESS NOTES
"[facility]  Progress Note  Name: Michael Chan I  MRN: [id number]  Unit/Bed#: [facility] 816-01 I Date of Admission: 2/18/2024   Date of Service: 2/21/2024 I Hospital Day: 2    Assessment/Plan   * Rhabdomyolysis  Assessment & Plan  Reported bilateral upper extremity \"tightness\" that began about 8 hours after he exercised at the [employer] gym this past Thursday; reported waking up the next morning and noticing that both of his arms were significantly swollen although not painful.  Reported that swelling worsened prompting him to come to the ER.  Patient reported that he did use weights including performing dumbbell bicep curls as well as bench press; reported that last time that he worked out similar to this was in November.  Denied that the workout was far in excess of what he has done in the past and reported that he was at the gym about 45 minutes  CK significantly elevated: 60,550, initially improved to 36,550, at which point IVF rate was decreased, however CK then elevated to 73,400 and IVF rate was increased again. Subsequent CKs have improved: 61,739 --> 30,152 and Nephrology recommended to decrease LR to 250cc/hr and decrease checking CK levels to Q8hr  Appreciate ongoing Nephrology recs  Creatinine remains within normal limits  Noted mild leukocytosis of 14.0 on admission, resolved, no clear infectious etiology identified and being monitored off antibiotics at this time   Given patient's tense swollen bilateral upper extremities, General Surgery evaluated due to concern for compartment syndrome - no acute surgical intervention planned  Bilateral venous ultrasounds of the upper extremities was Negative for DVT per the results report    Swelling of arm  Assessment & Plan  See rhabdo section above    Hypokalemia  Assessment & Plan  Resolved with repletion, monitor       Hypomagnesemia  Assessment & Plan  Mag 1.7, replete and monitor             VTE Pharmacologic " Prophylaxis: VTE Score: 2 Low Risk (Score 0-2) - Encourage Ambulation.    Mobility:   Basic Mobility Inpatient Raw Score: 23  JH-HLM Goal: 7: Walk 25 feet or more  JH-HLM Achieved: 7: Walk 25 feet or more  HLM Goal achieved. Continue to encourage appropriate mobility.    Patient Centered Rounds: I performed bedside rounds with nursing staff today.  Lachelle  Discussions with Specialists or Other Care Team Provider:  and Nephrologist     Education and Discussions with Family / Patient: Patient declined call to .     Total Time Spent on Date of Encounter in care of patient: 34 mins. This time was spent on one or more of the following: performing physical exam; counseling and coordination of care; obtaining or reviewing history; documenting in the medical record; reviewing/ordering tests, medications or procedures; communicating with other healthcare professionals and discussing with patient's family/caregivers.    Current Length of Stay: 2 day(s)  Current Patient Status: Inpatient   Certification Statement: The patient will continue to require additional inpatient hospital stay due to rhabdo, IVF  Discharge Plan:  pending improvement in CK, and Nephrology clearance    Code Status: Level 1 - Full Code    Subjective:   Mr. Chan reports continued, unchanged arm swelling. Denies any pain. Reports urinating a lot     Objective:     Vitals:   Temp (24hrs), Av.3 °F (36.8 °C), Min:97.9 °F (36.6 °C), Max:98.9 °F (37.2 °C)    Temp:  [97.9 °F (36.6 °C)-98.9 °F (37.2 °C)] 98 °F (36.7 °C)  HR:  [56-78] 62  Resp:  [16-18] 18  BP: (118-134)/(60-89) 134/89  SpO2:  [97 %-99 %] 98 %  There is no height or weight on file to calculate BMI.     Input and Output Summary (last 24 hours):     Intake/Output Summary (Last 24 hours) at 2024 1043  Last data filed at 2024 0817  Gross per 24 hour   Intake 69122.17 ml   Output 9225 ml   Net 7194.17 ml       Physical Exam:   Physical Exam  Vitals reviewed.    Constitutional:       Comments: Patient seen sitting in bed comfortably resting, NAD   Cardiovascular:      Rate and Rhythm: Normal rate and regular rhythm.   Pulmonary:      Effort: Pulmonary effort is normal. No respiratory distress.      Breath sounds: Normal breath sounds.   Abdominal:      General: Bowel sounds are normal.      Palpations: Abdomen is soft.      Tenderness: There is no abdominal tenderness.   Musculoskeletal:         General: Swelling present. No tenderness.   Skin:     General: Skin is warm.   Neurological:      Mental Status: He is alert and oriented to person, place, and time.   Psychiatric:         Mood and Affect: Mood normal.         Behavior: Behavior normal.          Additional Data:     Labs:  Results from last 7 days   Lab Units 02/21/24  0533 02/20/24  0420   WBC Thousand/uL 7.49 8.68   HEMOGLOBIN g/dL 11.7* 12.9   HEMATOCRIT % 32.7* 37.7   PLATELETS Thousands/uL 210 248   NEUTROS PCT %  --  66   LYMPHS PCT %  --  23   MONOS PCT %  --  8   EOS PCT %  --  2     Results from last 7 days   Lab Units 02/21/24  0533   SODIUM mmol/L 139   POTASSIUM mmol/L 3.8   CHLORIDE mmol/L 106   CO2 mmol/L 26   BUN mg/dL 9   CREATININE mg/dL 0.56*   ANION GAP mmol/L 7   CALCIUM mg/dL 8.5   ALBUMIN g/dL 3.2*   TOTAL BILIRUBIN mg/dL 0.28   ALK PHOS U/L 41   ALT U/L 182*   AST U/L 559*   GLUCOSE RANDOM mg/dL 121     Results from last 7 days   Lab Units 02/19/24  0433   INR  1.17     Results from last 7 days   Lab Units 02/21/24  0733   POC GLUCOSE mg/dl 101               Lines/Drains:  Invasive Devices       Peripheral Intravenous Line  Duration             Peripheral IV 02/19/24 Dorsal (posterior);Left Hand 2 days    Peripheral IV 02/20/24 Right;Ventral (anterior) Hand 1 day                          Imaging: No pertinent imaging reviewed.    Recent Cultures (last 7 days):   Results from last 7 days   Lab Units 02/19/24  0211 02/19/24  0207   BLOOD CULTURE  No Growth at 48 hrs. No Growth at 48 hrs.        Last 24 Hours Medication List:   Current Facility-Administered Medications   Medication Dose Route Frequency Provider Last Rate    heparin (porcine)  5,000 Units Subcutaneous Q8H [facility] Oliverio Chan DO      lactated ringers  250 mL/hr Intravenous Continuous Santos Swain  mL/hr (02/21/24 0946)    magnesium sulfate  2 g Intravenous Once Santos Swain MD          Today, Patient Was Seen By: Messi Moss PA-C    **Please Note: This note may have been constructed using a voice recognition system.**

## 2024-02-21 NOTE — UTILIZATION REVIEW
Continued Stay Review    Date: 2/21/24                          Current Patient Class: inpatient  Current Level of Care: med surg    HPI:18 y.o. male initially admitted on 2/19/24 Rhabdomyolysis     Assessment/Plan:   Subsequent CKs have improved: 61,739 --> 30,152 and Nephrology recommended to decrease LR to 250cc/hr and decrease checking CK levels to Q8hr. Nephrology following. Continue to monitor electrolytes and replete prn.    Vital Signs:   Date/Time Temp Pulse Resp BP MAP (mmHg) SpO2 O2 Device Patient Position - Orthostatic VS   02/21/24 15:04:48 98.3 °F (36.8 °C) 70 17 116/50 72 97 % -- --   02/21/24 07:57:48 98 °F (36.7 °C) 62 18 134/89 104 98 % -- --   02/21/24 04:50:59 97.9 °F (36.6 °C) 58 16 118/61 80 97 % -- --   02/21/24 0300 97.9 °F (36.6 °C) -- 16 120/60 -- -- -- Lying   02/20/24 21:48:43 98.4 °F (36.9 °C) 67 16 130/89 103 97 % -- --   02/20/24 19:20:26 98.9 °F (37.2 °C) 78 16 132/87 102 97 % -- --   02/20/24 15:04:03 98.7 °F (37.1 °C) 56 18 128/79 95 99 % -- --   02/20/24 1100 -- -- -- -- -- -- None (Room air) --   02/20/24 10:50:21 98 °F (36.7 °C) 69 16 129/79 96 98 % -- --   02/20/24 1000 -- 71 26 Abnormal  124/73 94 98 % None (Room air) Lying   02/20/24 0800 -- 69 19 120/73 91 98 % None (Room air) Lying   02/20/24 0645 -- 51 Abnormal  16 -- -- 97 % -- --   02/20/24 0600 -- 54 Abnormal  13 114/64 82 97 % None (Room air) Lying   02/20/24 0200 -- 54 Abnormal  15 111/55 76 97 % -- --   02/20/24 0000 98.7 °F (37.1 °C) 62 20 125/61 88 98 % None (Room air) Sitting   02/19/24 2115 -- 89 18 128/67 91 97 % None (Room air) Sitting   02/19/24 1000 -- 83 18 139/67 96 98 % None (Room air) Lying   02/19/24 0738 -- 88 16 138/78 100 98 % None (Room air) Lying   02/19/24 0445 -- 62 14 141/94 105 97 % None (Room air) Lying   02/19/24 0245 -- 81 17 129/69 92 96 % None (Room air) Lying   02/19/24 0030 -- 84 18 147/80 105 98 % None (Room air) Sitting   02/19/24 0000 -- 85 16 133/68 94 98 % None (Room air) Sitting      Pertinent Labs/Diagnostic Results:   Results from last 7 days   Lab Units 02/19/24 0207   SARS-COV-2  Negative     Results from last 7 days   Lab Units 02/21/24 0533 02/20/24 0420 02/19/24 0433 02/18/24  2233   WBC Thousand/uL 7.49 8.68 10.07 14.45*   HEMOGLOBIN g/dL 11.7* 12.9 12.8 15.2   HEMATOCRIT % 32.7* 37.7 36.7 42.0   PLATELETS Thousands/uL 210 248 240 307   NEUTROS ABS Thousands/µL  --  5.82 6.99 11.62*         Results from last 7 days   Lab Units 02/21/24 0533 02/20/24 0420 02/19/24 0433 02/19/24 0207 02/18/24 2233   SODIUM mmol/L 139 138 138  --  138   POTASSIUM mmol/L 3.8 3.8 3.3*  --  3.7   CHLORIDE mmol/L 106 106 106  --  104   CO2 mmol/L 26 29 25  --  25   ANION GAP mmol/L 7 3 7  --  9   BUN mg/dL 9 7 13  --  21   CREATININE mg/dL 0.56* 0.65 0.65  --  0.80   EGFR ml/min/1.73sq m 149 141 141  --  129   CALCIUM mg/dL 8.5 8.8 7.8*  --  8.8   MAGNESIUM mg/dL 1.7* 1.6* 1.9  --   --    PHOSPHORUS mg/dL  --   --   --  3.2  --      Results from last 7 days   Lab Units 02/21/24 0533 02/19/24 0433 02/18/24 2233   AST U/L 559* 693* 984*   ALT U/L 182* 185* 238*   ALK PHOS U/L 41 44 55   TOTAL PROTEIN g/dL 5.0* 5.1* 6.4   ALBUMIN g/dL 3.2* 3.4* 4.2   TOTAL BILIRUBIN mg/dL 0.28 0.41 0.71     Results from last 7 days   Lab Units 02/21/24  0733   POC GLUCOSE mg/dl 101     Results from last 7 days   Lab Units 02/21/24 0533 02/20/24 0420 02/19/24 0433 02/18/24 2233   GLUCOSE RANDOM mg/dL 121 94 91 121     Results from last 7 days   Lab Units 02/21/24  0533 02/20/24  1636 02/20/24  0420   CK TOTAL U/L 30,152* 61,739* 73,400*     Results from last 7 days   Lab Units 02/19/24  0433 02/19/24  0207   PROTIME seconds 14.8* 14.5   INR  1.17 1.14   PTT seconds 29 30     Results from last 7 days   Lab Units 02/19/24  0207   TSH 3RD GENERATON uIU/mL 1.922     Results from last 7 days   Lab Units 02/19/24  0207   CRP mg/L 10.4*   SED RATE mm/hour 1     Results from last 7 days   Lab Units 02/18/24  2371    CLARITY UA  Clear   COLOR UA  Light Orange   SPEC GRAV UA  1.026   PH UA  6.0   GLUCOSE UA mg/dl Negative   KETONES UA mg/dl 80 (3+)*   BLOOD UA  Large*   PROTEIN UA mg/dl 100 (2+)*   NITRITE UA  Negative   BILIRUBIN UA  Negative   UROBILINOGEN UA (BE) mg/dl 2.0*   LEUKOCYTES UA  Negative   WBC UA /hpf 1-2   RBC UA /hpf None Seen   BACTERIA UA /hpf None Seen   EPITHELIAL CELLS WET PREP /hpf None Seen   MUCUS THREADS  Occasional*     Results from last 7 days   Lab Units 02/19/24  0207   INFLUENZA A PCR  Negative   INFLUENZA B PCR  Negative   RSV PCR  Negative         Results from last 7 days   Lab Units 02/19/24  1000   AMPH/METH  Negative   BARBITURATE UR  Negative   BENZODIAZEPINE UR  Negative   COCAINE UR  Negative   METHADONE URINE  Negative   OPIATE UR  Negative   PCP UR  Negative   THC UR  Negative     Results from last 7 days   Lab Units 02/19/24  0211 02/19/24  0207   BLOOD CULTURE  No Growth at 48 hrs. No Growth at 48 hrs.     Medications:   Scheduled Medications: none     Continuous IV Infusions:  lactated ringers, 250 mL/hr, Intravenous, Continuous      PRN Meds: none       Discharge Plan: tbd    Network Utilization Review Department  ATTENTION: Please call with any questions or concerns to [phone] and carefully listen to the prompts so that you are directed to the right person. All voicemails are confidential.   For Discharge needs, contact Care Management DC Support Team at [phone] opt. 2  Send all requests for admission clinical reviews, approved or denied determinations and any other requests to dedicated fax number below belonging to the campus where the patient is receiving treatment. List of dedicated fax numbers for the Facilities:  FACILITY NAME UR FAX NUMBER   ADMISSION DENIALS (Administrative/Medical Necessity) [phone]   DISCHARGE SUPPORT TEAM (NETWORK) [phone]   PARENT CHILD HEALTH (Maternity/NICU/Pediatrics) [phone]   [facility]  [phone]   [facility] [phone]   [facility] [phone]   [facility] [phone]   Replaced by [facility] [phone]   [facility] [phone]   [facility] [phone]   [facility] [phone]   [facility] [phone]   [facility] [phone]   [facility] [phone]   [facility] [phone]

## 2024-02-21 NOTE — PLAN OF CARE
Problem: PAIN - ADULT  Goal: Verbalizes/displays adequate comfort level or baseline comfort level  Description: Interventions:  - Encourage patient to monitor pain and request assistance  - Assess pain using appropriate pain scale  - Administer analgesics based on type and severity of pain and evaluate response  - Implement non-pharmacological measures as appropriate and evaluate response  - Consider cultural and social influences on pain and pain management  - Notify physician/advanced practitioner if interventions unsuccessful or patient reports new pain  Outcome: Progressing     Problem: INFECTION - ADULT  Goal: Absence or prevention of progression during hospitalization  Description: INTERVENTIONS:  - Assess and monitor for signs and symptoms of infection  - Monitor lab/diagnostic results  - Monitor all insertion sites, i.e. indwelling lines, tubes, and drains  - Monitor endotracheal if appropriate and nasal secretions for changes in amount and color  - [place] appropriate cooling/warming therapies per order  - Administer medications as ordered  - Instruct and encourage patient and family to use good hand hygiene technique  - Identify and instruct in appropriate isolation precautions for identified infection/condition  Outcome: Progressing  Goal: Absence of fever/infection during neutropenic period  Description: INTERVENTIONS:  - Monitor WBC    Outcome: Progressing     Problem: SAFETY ADULT  Goal: Patient will remain free of falls  Description: INTERVENTIONS:  - Educate patient/family on patient safety including physical limitations  - Instruct patient to call for assistance with activity   - Consult OT/PT to assist with strengthening/mobility   - Keep Call bell within reach  - Keep bed low and locked with side rails adjusted as appropriate  - Keep care items and personal belongings within reach  - Initiate and maintain comfort rounds  - Consider moving patient to room near nurses station  Outcome:  Progressing  Goal: Maintain or return to baseline ADL function  Description: INTERVENTIONS:  -  Assess patient's ability to carry out ADLs; assess patient's baseline for ADL function and identify physical deficits which impact ability to perform ADLs (bathing, care of mouth/teeth, toileting, grooming, dressing, etc.)  - Assess/evaluate cause of self-care deficits   - Assess range of motion  - Assess patient's mobility; develop plan if impaired  - Assess patient's need for assistive devices and provide as appropriate  - Encourage maximum independence but intervene and supervise when necessary  - Involve family in performance of ADLs  - Assess for home care needs following discharge   - Consider OT consult to assist with ADL evaluation and planning for discharge  - Provide patient education as appropriate  Outcome: Progressing  Goal: Maintains/Returns to pre admission functional level  Description: INTERVENTIONS:  - Perform AM-PAC 6 Click Basic Mobility/ Daily Activity assessment daily.  - Set and communicate daily mobility goal to care team and patient/family/caregiver.   - Collaborate with rehabilitation services on mobility goals if consulted  - Record patient progress and toleration of activity level   Outcome: Progressing     Problem: DISCHARGE PLANNING  Goal: Discharge to home or other facility with appropriate resources  Description: INTERVENTIONS:  - Identify barriers to discharge w/patient and caregiver  - Arrange for needed discharge resources and transportation as appropriate  - Identify discharge learning needs (meds, wound care, etc.)  - Arrange for interpretive services to assist at discharge as needed  - Refer to Case Management Department for coordinating discharge planning if the patient needs post-hospital services based on physician/advanced practitioner order or complex needs related to functional status, cognitive ability, or social support system  Outcome: Progressing     Problem: Knowledge  Deficit  Goal: Patient/family/caregiver demonstrates understanding of disease process, treatment plan, medications, and discharge instructions  Description: Complete learning assessment and assess knowledge base.  Interventions:  - Provide teaching at level of understanding  - Provide teaching via preferred learning methods  Outcome: Progressing

## 2024-02-21 NOTE — PROGRESS NOTES
NEPHROLOGY HOSPITAL PROGRESS NOTE   Michael Chan 18 y.o. male MRN: [id number]  Unit/Bed#: [facility] 816-01 Encounter: [id number]  Reason for Consult: Rhabdomyolysis    ASSESSMENT and PLAN:    Rhabdomyolysis.  Likely exercise-induced.  Currently on IV fluids at 500 cc/h.  Kidney function remains within normal limits.  CK level on admission 60,000, with peak level 73,000 on 02/20.  CK level today improved to 30,000.  Decrease IV fluids to 250 cc/h.  Trend CK and renal function.    2.  Polyuria.  Iatrogenic due to high rate of IVF administration.  Monitor urine output and electrolytes closely.    3.  Hypokalemia.  Serum potassium today 3.8.    4.  Hypomagnesemia.  Serum magnesium today 1.7.  Give IV magnesium 2 g x 1.    5.  Transaminitis.  Most likely due to rhabdomyolysis.  LFTs improving with improvement in CK level.    Discussed with internal medicine team.  After discussion, we agreed that CK level is currently improving and kidney function remains within normal limits and to decrease IV fluids to 250 cc/h today.    SUBJECTIVE / 24H INTERVAL HISTORY:  Urine output recorded as 8850 cc.  Complains of swelling in his bilateral upper extremities and swelling on his side.  Denies dyspnea.    OBJECTIVE:  Current Weight:    Vitals:    02/20/24 1920 02/20/24 2148 02/21/24 0300 02/21/24 0450   BP: 132/87 130/89 120/60 118/61   BP Location:   Left arm    Pulse: 78 67  58   Resp: 16 16 16 16   Temp: 98.9 °F (37.2 °C) 98.4 °F (36.9 °C) 97.9 °F (36.6 °C) 97.9 °F (36.6 °C)   TempSrc:   Oral    SpO2: 97% 97%  97%       Intake/Output Summary (Last 24 hours) at 2/21/2024 0713  Last data filed at 2/21/2024 0606  Gross per 24 hour   Intake 92074.84 ml   Output 8850 ml   Net 6510.84 ml     Review of Systems   Constitutional:  Negative for chills and fever.   HENT:  Negative for ear pain and sore throat.    Eyes:  Negative for pain and visual disturbance.   Respiratory:  Negative for cough and shortness of breath.    Cardiovascular:   Negative for chest pain and palpitations.   Gastrointestinal:  Negative for abdominal pain and vomiting.   Genitourinary:  Negative for dysuria and hematuria.   Musculoskeletal:  Negative for arthralgias and back pain.   Skin:  Negative for color change and rash.   Neurological:  Negative for seizures and syncope.   All other systems reviewed and are negative.    Physical Exam  Vitals and nursing note reviewed.   Constitutional:       General: He is not in acute distress.     Appearance: He is well-developed.   HENT:      Head: Normocephalic and atraumatic.   Eyes:      Conjunctiva/sclera: Conjunctivae normal.   Cardiovascular:      Rate and Rhythm: Normal rate and regular rhythm.      Heart sounds: No murmur heard.  Pulmonary:      Effort: Pulmonary effort is normal. No respiratory distress.      Breath sounds: Normal breath sounds.   Abdominal:      Palpations: Abdomen is soft.      Tenderness: There is no abdominal tenderness.   Musculoskeletal:         General: Swelling present.      Cervical back: Neck supple.      Right lower leg: No edema.      Left lower leg: No edema.   Skin:     General: Skin is warm and dry.      Capillary Refill: Capillary refill takes less than 2 seconds.   Neurological:      Mental Status: He is alert.   Psychiatric:         Mood and Affect: Mood normal.       Medications:    Current Facility-Administered Medications:     heparin (porcine) subcutaneous injection 5,000 Units, 5,000 Units, Subcutaneous, Q8H [facility], Oliverio Chan,     lactated ringers infusion, 500 mL/hr, Intravenous, Continuous, Messi Moss PA-C, Last Rate: 500 mL/hr at 02/21/24 0608, 500 mL/hr at 02/21/24 0608    Laboratory Results:  Results from last 7 days   Lab Units 02/21/24  0533 02/20/24  0420 02/19/24  0433 02/19/24  0207 02/18/24  2233   WBC Thousand/uL 7.49 8.68 10.07  --  14.45*   HEMOGLOBIN g/dL 11.7* 12.9 12.8  --  15.2   HEMATOCRIT % 32.7* 37.7 36.7  --  42.0   PLATELETS Thousands/uL 210 248 240  --  307  "  POTASSIUM mmol/L  --  3.8 3.3*  --  3.7   CHLORIDE mmol/L  --  106 106  --  104   CO2 mmol/L  --  29 25  --  25   BUN mg/dL  --  7 13  --  21   CREATININE mg/dL  --  0.65 0.65  --  0.80   CALCIUM mg/dL  --  8.8 7.8*  --  8.8   MAGNESIUM mg/dL  --  1.6* 1.9  --   --    PHOSPHORUS mg/dL  --   --   --  3.2  --        Portions of the record may have been created with voice recognition software. Occasional wrong word or \"sound a like\" substitutions may have occurred due to the inherent limitations of voice recognition software. Read the chart carefully and recognize, using context, where substitutions have occurred. If you have any questions, please contact the dictating provider.    "

## 2024-02-22 LAB
ANION GAP SERPL CALCULATED.3IONS-SCNC: 7 MMOL/L
BUN SERPL-MCNC: 12 MG/DL (ref 5–25)
CALCIUM SERPL-MCNC: 9 MG/DL (ref 8.4–10.2)
CHLORIDE SERPL-SCNC: 105 MMOL/L (ref 96–108)
CK SERPL-CCNC: 6900 U/L (ref 39–308)
CK SERPL-CCNC: ABNORMAL U/L (ref 39–308)
CK SERPL-CCNC: ABNORMAL U/L (ref 39–308)
CO2 SERPL-SCNC: 28 MMOL/L (ref 21–32)
CREAT SERPL-MCNC: 0.6 MG/DL (ref 0.6–1.3)
GFR SERPL CREATININE-BSD FRML MDRD: 145 ML/MIN/1.73SQ M
GLUCOSE SERPL-MCNC: 105 MG/DL (ref 65–140)
MAGNESIUM SERPL-MCNC: 1.7 MG/DL (ref 1.9–2.7)
POTASSIUM SERPL-SCNC: 3.8 MMOL/L (ref 3.5–5.3)
SODIUM SERPL-SCNC: 140 MMOL/L (ref 135–147)

## 2024-02-22 PROCEDURE — 82550 ASSAY OF CK (CPK): CPT | Performed by: PHYSICIAN ASSISTANT

## 2024-02-22 PROCEDURE — 80048 BASIC METABOLIC PNL TOTAL CA: CPT | Performed by: PHYSICIAN ASSISTANT

## 2024-02-22 PROCEDURE — 83735 ASSAY OF MAGNESIUM: CPT | Performed by: PHYSICIAN ASSISTANT

## 2024-02-22 PROCEDURE — 99232 SBSQ HOSP IP/OBS MODERATE 35: CPT | Performed by: STUDENT IN AN ORGANIZED HEALTH CARE EDUCATION/TRAINING PROGRAM

## 2024-02-22 PROCEDURE — 99232 SBSQ HOSP IP/OBS MODERATE 35: CPT | Performed by: INTERNAL MEDICINE

## 2024-02-22 PROCEDURE — 82550 ASSAY OF CK (CPK): CPT | Performed by: INTERNAL MEDICINE

## 2024-02-22 RX ORDER — MAGNESIUM SULFATE HEPTAHYDRATE 40 MG/ML
2 INJECTION, SOLUTION INTRAVENOUS ONCE
Status: COMPLETED | OUTPATIENT
Start: 2024-02-22 | End: 2024-02-22

## 2024-02-22 RX ADMIN — SODIUM CHLORIDE, SODIUM LACTATE, POTASSIUM CHLORIDE, AND CALCIUM CHLORIDE 300 ML/HR: .6; .31; .03; .02 INJECTION, SOLUTION INTRAVENOUS at 17:41

## 2024-02-22 RX ADMIN — MAGNESIUM SULFATE HEPTAHYDRATE 2 G: 40 INJECTION, SOLUTION INTRAVENOUS at 09:02

## 2024-02-22 RX ADMIN — SODIUM CHLORIDE, SODIUM LACTATE, POTASSIUM CHLORIDE, AND CALCIUM CHLORIDE 300 ML/HR: .6; .31; .03; .02 INJECTION, SOLUTION INTRAVENOUS at 13:53

## 2024-02-22 NOTE — PROGRESS NOTES
NEPHROLOGY HOSPITAL PROGRESS NOTE   Michael Chan 18 y.o. male MRN: [id number]  Unit/Bed#: [facility] 816-01 Encounter: [id number]  Reason for Consult: Rhabdomyolysis    ASSESSMENT and PLAN:    Rhabdomyolysis.  Likely exercise-induced.  Kidney function remains within normal limits.  CK level on admission 60,000, with peak level of 73,000 on 02/20.  CK level improved to 30,000 on 02/21 and was down to 14,000 later in the evening after IV fluid rate was decreased to 250 cc/h from 500 cc/h.  There has been subsequent increase in CK levels overnight 02/21 to 64,000.  CK level down again to around 30,000 this morning.  Increase IV fluids to 300 cc/h.  Notes reviewed from surgical team and no concern for compartment syndrome at this time.  Consider rheumatology consultation for further evaluation of elevated CK level and to rule out autoimmune causes.  Discussed with patient's nurse and discussed drawing CK level without use of tourniquet.    2.  Polyuria.  This is iatrogenic due to high rate of IVF administration.  Monitor urine output and electrolytes closely.    3.  Hypokalemia.  Potassium level today 3.8.  No need for potassium supplementation.    4.  Hypomagnesemia.  Magnesium level today 1.7.  Give IV magnesium 2 g x 1.    5.  Transaminitis.  Most likely due to rhabdomyolysis.  LFTs improving as of 02/21.    Discussed with internal medicine team.  After discussion, we agreed to increase IV fluids to 300 cc/h and to consider rheumatology consultation for further evaluation of elevated CK.    SUBJECTIVE / 24H INTERVAL HISTORY:  Urine output recorded as 8000 cc.  Denies dyspnea.  Denies leg swelling.  Continues to have swelling and pain in upper extremities.    OBJECTIVE:  Current Weight: Weight - Scale: 78.9 kg (174 lb)  Vitals:    02/21/24 1504 02/21/24 1839 02/21/24 2258 02/22/24 0713   BP: 116/50  121/73 135/74   BP Location:       Pulse: 70  74 55   Resp: 17  18    Temp: 98.3 °F (36.8 °C)  98.2 °F (36.8 °C) 97.9 °F  "(36.6 °C)   TempSrc:       SpO2: 97%  98% 98%   Weight:  78.9 kg (174 lb)     Height:  5' 9.5\" (1.765 m)         Intake/Output Summary (Last 24 hours) at 2/22/2024 0944  Last data filed at 2/22/2024 0529  Gross per 24 hour   Intake 4100 ml   Output 6600 ml   Net -2500 ml     Review of Systems   Constitutional:  Negative for chills and fever.   HENT:  Negative for ear pain and sore throat.    Eyes:  Negative for pain and visual disturbance.   Respiratory:  Negative for cough and shortness of breath.    Cardiovascular:  Negative for chest pain and palpitations.   Gastrointestinal:  Negative for abdominal pain and vomiting.   Genitourinary:  Negative for dysuria and hematuria.   Musculoskeletal:  Negative for arthralgias and back pain.   Skin:  Negative for color change and rash.   Neurological:  Negative for seizures and syncope.   All other systems reviewed and are negative.    Physical Exam  Vitals and nursing note reviewed.   Constitutional:       General: He is not in acute distress.     Appearance: He is well-developed.   HENT:      Head: Normocephalic and atraumatic.   Eyes:      Conjunctiva/sclera: Conjunctivae normal.   Cardiovascular:      Rate and Rhythm: Normal rate and regular rhythm.      Heart sounds: No murmur heard.  Pulmonary:      Effort: Pulmonary effort is normal. No respiratory distress.      Breath sounds: Normal breath sounds.   Abdominal:      Palpations: Abdomen is soft.      Tenderness: There is no abdominal tenderness.   Musculoskeletal:         General: Swelling present.      Cervical back: Neck supple.      Right lower leg: No edema.      Left lower leg: No edema.      Comments: Bilateral upper extremity swelling   Skin:     General: Skin is warm and dry.      Capillary Refill: Capillary refill takes less than 2 seconds.   Neurological:      Mental Status: He is alert.   Psychiatric:         Mood and Affect: Mood normal.       Medications:    Current Facility-Administered Medications:     " "lactated ringers infusion, 300 mL/hr, Intravenous, Continuous, Santos Swain MD, Last Rate: 250 mL/hr at 02/21/24 2252, 250 mL/hr at 02/21/24 2252    magnesium sulfate 2 g/50 mL IVPB (premix) 2 g, 2 g, Intravenous, Once, Yann Katz DO, Last Rate: 25 mL/hr at 02/22/24 0902, 2 g at 02/22/24 0902    Laboratory Results:  Results from last 7 days   Lab Units 02/22/24  0528 02/21/24  0533 02/20/24  0420 02/19/24  0433 02/19/24  0207 02/18/24  2233   WBC Thousand/uL  --  7.49 8.68 10.07  --  14.45*   HEMOGLOBIN g/dL  --  11.7* 12.9 12.8  --  15.2   HEMATOCRIT %  --  32.7* 37.7 36.7  --  42.0   PLATELETS Thousands/uL  --  210 248 240  --  307   POTASSIUM mmol/L 3.8 3.8 3.8 3.3*  --  3.7   CHLORIDE mmol/L 105 106 106 106  --  104   CO2 mmol/L 28 26 29 25  --  25   BUN mg/dL 12 9 7 13  --  21   CREATININE mg/dL 0.60 0.56* 0.65 0.65  --  0.80   CALCIUM mg/dL 9.0 8.5 8.8 7.8*  --  8.8   MAGNESIUM mg/dL 1.7* 1.7* 1.6* 1.9  --   --    PHOSPHORUS mg/dL  --   --   --   --  3.2  --        Portions of the record may have been created with voice recognition software. Occasional wrong word or \"sound a like\" substitutions may have occurred due to the inherent limitations of voice recognition software. Read the chart carefully and recognize, using context, where substitutions have occurred. If you have any questions, please contact the dictating provider.    "

## 2024-02-22 NOTE — PLAN OF CARE
Problem: PAIN - ADULT  Goal: Verbalizes/displays adequate comfort level or baseline comfort level  Description: Interventions:  - Encourage patient to monitor pain and request assistance  - Assess pain using appropriate pain scale  - Administer analgesics based on type and severity of pain and evaluate response  - Implement non-pharmacological measures as appropriate and evaluate response  - Consider cultural and social influences on pain and pain management  - Notify physician/advanced practitioner if interventions unsuccessful or patient reports new pain  Outcome: Progressing     Problem: INFECTION - ADULT  Goal: Absence or prevention of progression during hospitalization  Description: INTERVENTIONS:  - Assess and monitor for signs and symptoms of infection  - Monitor lab/diagnostic results  - Monitor all insertion sites, i.e. indwelling lines, tubes, and drains  - Monitor endotracheal if appropriate and nasal secretions for changes in amount and color  - [place] appropriate cooling/warming therapies per order  - Administer medications as ordered  - Instruct and encourage patient and family to use good hand hygiene technique  - Identify and instruct in appropriate isolation precautions for identified infection/condition  Outcome: Progressing  Goal: Absence of fever/infection during neutropenic period  Description: INTERVENTIONS:  - Monitor WBC    Outcome: Progressing     Problem: DISCHARGE PLANNING  Goal: Discharge to home or other facility with appropriate resources  Description: INTERVENTIONS:  - Identify barriers to discharge w/patient and caregiver  - Arrange for needed discharge resources and transportation as appropriate  - Identify discharge learning needs (meds, wound care, etc.)  - Arrange for interpretive services to assist at discharge as needed  - Refer to Case Management Department for coordinating discharge planning if the patient needs post-hospital services based on physician/advanced  practitioner order or complex needs related to functional status, cognitive ability, or social support system  Outcome: Progressing     Problem: Knowledge Deficit  Goal: Patient/family/caregiver demonstrates understanding of disease process, treatment plan, medications, and discharge instructions  Description: Complete learning assessment and assess knowledge base.  Interventions:  - Provide teaching at level of understanding  - Provide teaching via preferred learning methods  Outcome: Progressing

## 2024-02-22 NOTE — PLAN OF CARE
Problem: PAIN - ADULT  Goal: Verbalizes/displays adequate comfort level or baseline comfort level  Description: Interventions:  - Encourage patient to monitor pain and request assistance  - Assess pain using appropriate pain scale  - Administer analgesics based on type and severity of pain and evaluate response  - Implement non-pharmacological measures as appropriate and evaluate response  - Consider cultural and social influences on pain and pain management  - Notify physician/advanced practitioner if interventions unsuccessful or patient reports new pain  Outcome: Progressing     Problem: INFECTION - ADULT  Goal: Absence or prevention of progression during hospitalization  Description: INTERVENTIONS:  - Assess and monitor for signs and symptoms of infection  - Monitor lab/diagnostic results  - Monitor all insertion sites, i.e. indwelling lines, tubes, and drains  - Monitor endotracheal if appropriate and nasal secretions for changes in amount and color  - [place] appropriate cooling/warming therapies per order  - Administer medications as ordered  - Instruct and encourage patient and family to use good hand hygiene technique  - Identify and instruct in appropriate isolation precautions for identified infection/condition  Outcome: Progressing  Goal: Absence of fever/infection during neutropenic period  Description: INTERVENTIONS:  - Monitor WBC    Outcome: Progressing     Problem: SAFETY ADULT  Goal: Patient will remain free of falls  Description: INTERVENTIONS:  - Educate patient/family on patient safety including physical limitations  - Instruct patient to call for assistance with activity   - Consult OT/PT to assist with strengthening/mobility   - Keep Call bell within reach  - Keep bed low and locked with side rails adjusted as appropriate  - Keep care items and personal belongings within reach  - Initiate and maintain comfort rounds  - Consider moving patient to room near nurses station  Outcome:  Progressing  Goal: Maintain or return to baseline ADL function  Description: INTERVENTIONS:  -  Assess patient's ability to carry out ADLs; assess patient's baseline for ADL function and identify physical deficits which impact ability to perform ADLs (bathing, care of mouth/teeth, toileting, grooming, dressing, etc.)  - Assess/evaluate cause of self-care deficits   - Assess range of motion  - Assess patient's mobility; develop plan if impaired  - Assess patient's need for assistive devices and provide as appropriate  - Encourage maximum independence but intervene and supervise when necessary  - Involve family in performance of ADLs  - Assess for home care needs following discharge   - Consider OT consult to assist with ADL evaluation and planning for discharge  - Provide patient education as appropriate  Outcome: Progressing  Goal: Maintains/Returns to pre admission functional level  Description: INTERVENTIONS:  - Perform AM-PAC 6 Click Basic Mobility/ Daily Activity assessment daily.  - Set and communicate daily mobility goal to care team and patient/family/caregiver.   - Collaborate with rehabilitation services on mobility goals if consulted  - Record patient progress and toleration of activity level   Outcome: Progressing     Problem: DISCHARGE PLANNING  Goal: Discharge to home or other facility with appropriate resources  Description: INTERVENTIONS:  - Identify barriers to discharge w/patient and caregiver  - Arrange for needed discharge resources and transportation as appropriate  - Identify discharge learning needs (meds, wound care, etc.)  - Arrange for interpretive services to assist at discharge as needed  - Refer to Case Management Department for coordinating discharge planning if the patient needs post-hospital services based on physician/advanced practitioner order or complex needs related to functional status, cognitive ability, or social support system  Outcome: Progressing     Problem: Knowledge  Deficit  Goal: Patient/family/caregiver demonstrates understanding of disease process, treatment plan, medications, and discharge instructions  Description: Complete learning assessment and assess knowledge base.  Interventions:  - Provide teaching at level of understanding  - Provide teaching via preferred learning methods  Outcome: Progressing

## 2024-02-22 NOTE — ASSESSMENT & PLAN NOTE
"Reported bilateral upper extremity \"tightness\" that began about 8 hours after he exercised at the [employer] gym this past Thursday; reported waking up the next morning and noticing that both of his arms were significantly swollen although not painful.  Reported that swelling worsened prompting him to come to the ER.  Patient reported that he did use weights including performing dumbbell bicep curls as well as bench press; reported that last time that he worked out similar to this was in November.  Denied that the workout was far in excess of what he has done in the past and reported that he was at the gym about 45 minutes  CK significantly elevated: 60,550, initially improved to 36,550, at which point IVF rate was decreased, however CK then elevated to 73,400 and IVF rate was increased again. Subsequent CKs have improved: 61,739 --> 30,152 and Nephrology recommended to decrease LR to 250cc/hr. Subsequent CK worsening to 64,270 which is now again downtrending to 31,212 and nephrology recommend increasing LR to 300 cc/h.  Appreciate ongoing Nephrology recs  Discussed with rheumatology who reports that patient's symptoms do not align with dermatomyositis or polymyositis given acute onset nature, especially given downtrending CKs without any immunosuppressive agents.   Discussed with neurology who also reports that patient's symptoms do not align with neuromuscular myopathies and recommend continuing IV hydration and trending CK.  If no significant improvement, could consider possible EMG.   Neurosurgery evaluated patient again on 2/22 who reported no evidence of compartment syndrome.  Creatinine remains within normal limits  Noted mild leukocytosis of 14.0 on admission, resolved, no clear infectious etiology identified and being monitored off antibiotics at this time   Bilateral venous ultrasounds of the upper extremities was Negative for DVT per the results report  "

## 2024-02-22 NOTE — PROGRESS NOTES
"[facility]  Progress Note  Name: Michael Chan I  MRN: [id number]  Unit/Bed#: [facility] 816-01 I Date of Admission: 2/18/2024   Date of Service: 2/22/2024 I Hospital Day: 3    Assessment/Plan   * Rhabdomyolysis  Assessment & Plan  Reported bilateral upper extremity \"tightness\" that began about 8 hours after he exercised at the [employer] gym this past Thursday; reported waking up the next morning and noticing that both of his arms were significantly swollen although not painful.  Reported that swelling worsened prompting him to come to the ER.  Patient reported that he did use weights including performing dumbbell bicep curls as well as bench press; reported that last time that he worked out similar to this was in November.  Denied that the workout was far in excess of what he has done in the past and reported that he was at the gym about 45 minutes  CK significantly elevated: 60,550, initially improved to 36,550, at which point IVF rate was decreased, however CK then elevated to 73,400 and IVF rate was increased again. Subsequent CKs have improved: 61,739 --> 30,152 and Nephrology recommended to decrease LR to 250cc/hr. Subsequent CK worsening to 64,270 which is now again downtrending to 31,212 and nephrology recommend increasing LR to 300 cc/h.  Appreciate ongoing Nephrology recs  Discussed with rheumatology who reports that patient's symptoms do not align with dermatomyositis or polymyositis given acute onset nature, especially given downtrending CKs without any immunosuppressive agents.   Discussed with neurology who also reports that patient's symptoms do not align with neuromuscular myopathies and recommend continuing IV hydration and trending CK.  If no significant improvement, could consider possible EMG.   Neurosurgery evaluated patient again on 2/22 who reported no evidence of compartment syndrome.  Creatinine remains within normal limits  Noted mild leukocytosis " of 14.0 on admission, resolved, no clear infectious etiology identified and being monitored off antibiotics at this time   Bilateral venous ultrasounds of the upper extremities was Negative for DVT per the results report    Hypomagnesemia  Assessment & Plan  Mag 1.7, replete and monitor    Hypokalemia  Assessment & Plan  Resolved with repletion, monitor       Swelling of arm  Assessment & Plan  See rhabdo section above           VTE Pharmacologic Prophylaxis: VTE Score: 2 Low Risk (Score 0-2) - Encourage Ambulation.    Mobility:   Basic Mobility Inpatient Raw Score: 23  JH-HLM Goal: 7: Walk 25 feet or more  JH-HLM Achieved: 7: Walk 25 feet or more  HLM Goal achieved. Continue to encourage appropriate mobility.    Patient Centered Rounds: I performed bedside rounds with nursing staff today.   Discussions with Specialists or Other Care Team Provider: Oncology, neurology    Education and Discussions with Family / Patient: Patient declined call to .     Total Time Spent on Date of Encounter in care of patient: 25 mins. This time was spent on one or more of the following: performing physical exam; counseling and coordination of care; obtaining or reviewing history; documenting in the medical record; reviewing/ordering tests, medications or procedures; communicating with other healthcare professionals and discussing with patient's family/caregivers.    Current Length of Stay: 3 day(s)  Current Patient Status: Inpatient   Certification Statement: The patient will continue to require additional inpatient hospital stay due to rhabdomyolysis requiring IV fluid  Discharge Plan: Anticipate discharge in 48-72 hrs to home.    Code Status: Level 1 - Full Code    Subjective:   Patient assessed at bedside.  Mild soreness of bilateral upper extremity.  Swelling persists although somewhat improved today.  Denies any numbness or tingling.  Strength is good.  Good urine output.  No rash.    Objective:     Vitals:   Temp  (24hrs), Av.1 °F (36.7 °C), Min:97.9 °F (36.6 °C), Max:98.2 °F (36.8 °C)    Temp:  [97.9 °F (36.6 °C)-98.2 °F (36.8 °C)] 98.2 °F (36.8 °C)  HR:  [53-74] 53  Resp:  [16-18] 16  BP: (121-135)/(73-74) 135/74  SpO2:  [98 %] 98 %  Body mass index is 25.33 kg/m².     Input and Output Summary (last 24 hours):     Intake/Output Summary (Last 24 hours) at 2024 1507  Last data filed at 2024 1448  Gross per 24 hour   Intake 8260 ml   Output 8525 ml   Net -265 ml       Physical Exam:   Physical Exam  Vitals reviewed.   Constitutional:       General: He is not in acute distress.     Appearance: Normal appearance. He is not ill-appearing.   HENT:      Head: Normocephalic and atraumatic.   Cardiovascular:      Rate and Rhythm: Normal rate and regular rhythm.      Pulses: Normal pulses.      Heart sounds: Normal heart sounds.   Pulmonary:      Effort: Pulmonary effort is normal. No respiratory distress.   Abdominal:      General: Bowel sounds are normal.      Tenderness: There is no abdominal tenderness.   Musculoskeletal:         General: Swelling (Bilateral upper extremity swelling, stable) present.      Right lower leg: No edema.      Left lower leg: No edema.   Skin:     Findings: No rash.   Neurological:      Mental Status: He is alert and oriented to person, place, and time. Mental status is at baseline.      Sensory: No sensory deficit.      Motor: No weakness.   Psychiatric:         Mood and Affect: Mood normal.         Behavior: Behavior normal.          Additional Data:     Labs:  Results from last 7 days   Lab Units 24  0533 24  0420   WBC Thousand/uL 7.49 8.68   HEMOGLOBIN g/dL 11.7* 12.9   HEMATOCRIT % 32.7* 37.7   PLATELETS Thousands/uL 210 248   NEUTROS PCT %  --  66   LYMPHS PCT %  --  23   MONOS PCT %  --  8   EOS PCT %  --  2     Results from last 7 days   Lab Units 24  0528 24  0533   SODIUM mmol/L 140 139   POTASSIUM mmol/L 3.8 3.8   CHLORIDE mmol/L 105 106   CO2 mmol/L 28  26   BUN mg/dL 12 9   CREATININE mg/dL 0.60 0.56*   ANION GAP mmol/L 7 7   CALCIUM mg/dL 9.0 8.5   ALBUMIN g/dL  --  3.2*   TOTAL BILIRUBIN mg/dL  --  0.28   ALK PHOS U/L  --  41   ALT U/L  --  182*   AST U/L  --  559*   GLUCOSE RANDOM mg/dL 105 121     Results from last 7 days   Lab Units 02/19/24  0433   INR  1.17     Results from last 7 days   Lab Units 02/21/24  0733   POC GLUCOSE mg/dl 101               Lines/Drains:  Invasive Devices       Peripheral Intravenous Line  Duration             Peripheral IV 02/19/24 Dorsal (posterior);Left Hand 3 days    Peripheral IV 02/20/24 Right;Ventral (anterior) Hand 2 days                          Imaging: Reviewed radiology reports from this admission including: chest xray and ultrasound(s)    Recent Cultures (last 7 days):   Results from last 7 days   Lab Units 02/19/24  0211 02/19/24  0207   BLOOD CULTURE  No Growth at 72 hrs. No Growth at 72 hrs.       Last 24 Hours Medication List:   Current Facility-Administered Medications   Medication Dose Route Frequency Provider Last Rate    lactated ringers  300 mL/hr Intravenous Continuous Santos Swain  mL/hr (02/22/24 1353)        Today, Patient Was Seen By: Yann Katz DO    **Please Note: This note may have been constructed using a voice recognition system.**

## 2024-02-22 NOTE — CASE MANAGEMENT
Case Management Assessment & Discharge Planning Note    Patient name Michael Chan  Location [facility]-01 MRN [id number]  : 2005 Date 2024       Current Admission Date: 2024  Current Admission Diagnosis:Rhabdomyolysis   Patient Active Problem List    Diagnosis Date Noted    Hypomagnesemia 2024    Swelling of arm 2024    Rhabdomyolysis 2024    Hypokalemia 2024      LOS (days): 3  Geometric Mean LOS (GMLOS) (days): 3.1  Days to GMLOS:-0.5     OBJECTIVE:    Risk of Unplanned Readmission Score: 7.95         Current admission status: Inpatient       Preferred Pharmacy:   PATIENT/FAMILY REPORTS NO PREFERRED PHARMACY  No address on file      Primary Care Provider: No primary care provider on file.    Primary Insurance: BLUE CROSS  Secondary Insurance:     ASSESSMENT:  Active Health Care Proxies    There are no active Health Care Proxies on file.       Readmission Root Cause  30 Day Readmission: No    Patient Information  Admitted from:: Home  Mental Status: Alert  During Assessment patient was accompanied by: Not accompanied during assessment  Assessment information provided by:: Patient  Primary Caregiver: Self  Support Systems: Self, Parent, Friend  County of Residence: [place] (specify in comment box) ([place])  What city do you live in?: [place]  Type of Current Residence: Other (Comment) ([facility])  Living Arrangements: Lives w/ Parent(s), Other (Comment) (Lives @ dorm at [employer])  Is patient a ?: No    Activities of Daily Living Prior to Admission  Functional Status: Independent  Completes ADLs independently?: Yes  Ambulates independently?: Yes  Does patient use assisted devices?: No  Does patient currently own DME?: No  Does patient have a history of Outpatient Therapy (PT/OT)?: Yes  Does the patient have a history of Short-Term Rehab?: No  Does patient have a history of HHC?: No  Does patient currently have HHC?: No         Patient  Information Continued  Income Source: Other (Comment) (Full time Student)  Does patient have prescription coverage?: Yes  Does patient receive dialysis treatments?: No  Does patient have a history of substance abuse?: No  Does patient have a history of Mental Health Diagnosis?: No         Means of Transportation  Means of Transport to Appts:: Drives Self          DISCHARGE DETAILS:    Discharge planning discussed with:: Patient  Freedom of Choice: Yes     CM contacted family/caregiver?: No- see comments (pt declined)  Were Treatment Team discharge recommendations reviewed with patient/caregiver?: Yes  Did patient/caregiver verbalize understanding of patient care needs?: Yes  Were patient/caregiver advised of the risks associated with not following Treatment Team discharge recommendations?: Yes    Contacts  Patient Contacts: Orion Chan (Father)  [phone]  Relationship to Patient:: Family  Contact Method: Phone  Phone Number: [phone]  Reason/Outcome: Continuity of Care, Emergency Contact, Discharge Planning    Requested Home Health Care         Is the patient interested in HHC at discharge?: No      Would you like to participate in our [facility] service program?  : No - Declined      Additional Comments: CM met w/ pt at bedside to introduce role and complete assessment. Pt is a full time student at [employer] and resides in a college dorm w/ a roommate. pt reported completing ADLS/ambulating independently. Pt has HX of OP pt/ot. Pt reported no HX of D&A or MH DX. Pt has no questions or concerns at this time. CM to follow.     CM reviewed d/c planning process including the following: identifying help at home, patient preference for d/c planning needs, Discharge Lounge, [facility] to Bed program, availability of treatment team to discuss questions or concerns patient and/or family may have regarding understanding medications and recognizing signs and symptoms once discharged.  CM also  encouraged patient to follow up with all recommended appointments after discharge. Patient advised of importance for patient and family to participate in managing patient’s medical well being.

## 2024-02-22 NOTE — ED PROVIDER NOTES
History  Chief Complaint   Patient presents with    Arm Swelling     PT c/o B/L arm swelling. Right arm worse than left. PT states it may have happened after working out. Right arm hard to touch, significant swelling noted. Denies numbness.      18-year-old male no significant past medical history presents ED complaining of 1 day of bilateral upper extremity swelling.  Patient tells me that he worked out at the [employer] yesterday doing light body weight exercises and light weight lifting.  Patient tells me he worked out for approximately 45 minutes and his routine was not particularly strenuous.  He tells me that he had not worked out in at least 2 to 3 months prior to this.  Patient denies any significant pain to the bilateral upper extremities.  He does endorse dark urine x 1.  No chest pain or shortness of breath.  No history of autoimmune conditions.  He does not take any medication on daily basis and has not taken any medication prior to arrival to the ED.        None       History reviewed. No pertinent past medical history.    History reviewed. No pertinent surgical history.    History reviewed. No pertinent family history.  I have reviewed and agree with the history as documented.    E-Cigarette/Vaping     E-Cigarette/Vaping Substances     Social History     Tobacco Use    Smoking status: Never    Smokeless tobacco: Never   Substance Use Topics    Alcohol use: Never    Drug use: Never        Review of Systems   Constitutional:  Negative for chills and fever.   HENT:  Negative for ear pain and sore throat.    Eyes:  Negative for pain and visual disturbance.   Respiratory:  Negative for cough and shortness of breath.    Cardiovascular:  Negative for chest pain and palpitations.   Gastrointestinal:  Negative for abdominal pain and vomiting.   Genitourinary:  Negative for dysuria and hematuria.   Musculoskeletal:  Negative for arthralgias and back pain.   Skin:  Negative for color change and rash.    Neurological:  Negative for seizures and syncope.   All other systems reviewed and are negative.      Physical Exam  ED Triage Vitals   Temperature Pulse Respirations Blood Pressure SpO2   02/18/24 2209 02/18/24 2209 02/18/24 2209 02/18/24 2211 02/18/24 2209   98.1 °F (36.7 °C) (!) 124 18 141/88 95 %      Temp Source Heart Rate Source Patient Position - Orthostatic VS BP Location FiO2 (%)   02/20/24 0000 02/18/24 2300 02/18/24 2300 02/18/24 2300 --   Oral Monitor Sitting Right arm       Pain Score       02/19/24 0445       No Pain             Orthostatic Vital Signs  Vitals:    02/21/24 1504 02/21/24 2258 02/22/24 0713 02/22/24 1446   BP: 116/50 121/73 135/74 135/74   Pulse: 70 74 55 (!) 53   Patient Position - Orthostatic VS:   Lying        Physical Exam  Vitals and nursing note reviewed.   Constitutional:       General: He is not in acute distress.     Appearance: He is well-developed.   HENT:      Head: Normocephalic and atraumatic.   Eyes:      Conjunctiva/sclera: Conjunctivae normal.   Cardiovascular:      Rate and Rhythm: Normal rate and regular rhythm.      Pulses: Normal pulses. No decreased pulses.      Heart sounds: No murmur heard.  Pulmonary:      Effort: Pulmonary effort is normal. No respiratory distress.      Breath sounds: Normal breath sounds.   Abdominal:      Palpations: Abdomen is soft.      Tenderness: There is no abdominal tenderness.   Musculoskeletal:         General: No swelling.      Cervical back: Neck supple.      Comments: Significant, tense swelling to the bilateral upper extremities right greater than left.    Effusions present to the bilateral olecranon's right greater than left.    Range of motion decreased secondary to swelling.  No tenderness to palpation of the bilateral biceps.   Skin:     General: Skin is warm and dry.      Capillary Refill: Capillary refill takes less than 2 seconds.   Neurological:      Mental Status: He is alert.   Psychiatric:         Mood and Affect: Mood  normal.         ED Medications  Medications   lactated ringers infusion (300 mL/hr Intravenous New Bag 2/22/24 1741)   multi-electrolyte (ISOLYTE-S PH 7.4) bolus 1,000 mL (0 mL Intravenous Stopped 2/19/24 0037)   multi-electrolyte (ISOLYTE-S PH 7.4) bolus 1,000 mL (0 mL Intravenous Stopped 2/19/24 0214)   potassium chloride (Klor-Con M20) CR tablet 40 mEq (40 mEq Oral Given 2/19/24 0735)   magnesium sulfate 2 g/50 mL IVPB (premix) 2 g (0 g Intravenous Stopped 2/21/24 0941)   magnesium sulfate 2 g/50 mL IVPB (premix) 2 g (0 g Intravenous Stopped 2/21/24 1508)   magnesium sulfate 2 g/50 mL IVPB (premix) 2 g (0 g Intravenous Stopped 2/22/24 1440)       Diagnostic Studies  Results Reviewed       Procedure Component Value Units Date/Time    Blood culture [[id number]] Collected: 02/19/24 0211    Lab Status: Preliminary result Specimen: Blood from Arm, Right Updated: 02/22/24 0801     Blood Culture No Growth at 72 hrs.    Blood culture [[id number]] Collected: 02/19/24 0207    Lab Status: Preliminary result Specimen: Blood from Hand, Left Updated: 02/22/24 0801     Blood Culture No Growth at 72 hrs.    Magnesium [[id number]]  (Abnormal) Collected: 02/21/24 0533    Lab Status: Final result Specimen: Blood from Arm, Left Updated: 02/21/24 0758     Magnesium 1.7 mg/dL     Comprehensive metabolic panel [[id number]]  (Abnormal) Collected: 02/21/24 0533    Lab Status: Final result Specimen: Blood from Arm, Left Updated: 02/21/24 0758     Sodium 139 mmol/L      Potassium 3.8 mmol/L      Chloride 106 mmol/L      CO2 26 mmol/L      ANION GAP 7 mmol/L      BUN 9 mg/dL      Creatinine 0.56 mg/dL      Glucose 121 mg/dL      Calcium 8.5 mg/dL      Corrected Calcium 9.1 mg/dL       U/L       U/L      Alkaline Phosphatase 41 U/L      Total Protein 5.0 g/dL      Albumin 3.2 g/dL      Total Bilirubin 0.28 mg/dL      eGFR 149 ml/min/1.73sq m     Narrative:      National Kidney Disease Foundation guidelines for Chronic Kidney  Disease (CKD):     Stage 1 with normal or high GFR (GFR > 90 mL/min/1.73 square meters)    Stage 2 Mild CKD (GFR = 60-89 mL/min/1.73 square meters)    Stage 3A Moderate CKD (GFR = 45-59 mL/min/1.73 square meters)    Stage 3B Moderate CKD (GFR = 30-44 mL/min/1.73 square meters)    Stage 4 Severe CKD (GFR = 15-29 mL/min/1.73 square meters)    Stage 5 End Stage CKD (GFR <15 mL/min/1.73 square meters)  Note: GFR calculation is accurate only with a steady state creatinine    CBC [[id number]]  (Abnormal) Collected: 02/21/24 0533    Lab Status: Final result Specimen: Blood from Arm, Left Updated: 02/21/24 0610     WBC 7.49 Thousand/uL      RBC 3.67 Million/uL      Hemoglobin 11.7 g/dL      Hematocrit 32.7 %      MCV 89 fL      MCH 31.9 pg      MCHC 35.8 g/dL      RDW 13.1 %      Platelets 210 Thousands/uL      MPV 10.0 fL     CK [[id number]]  (Abnormal) Collected: 02/20/24 1636    Lab Status: Final result Specimen: Blood from Arm, Right Updated: 02/20/24 1916     Total CK 61,739 U/L     CK [[id number]]  (Abnormal) Collected: 02/20/24 0420    Lab Status: Final result Specimen: Blood from Hand, Right Updated: 02/20/24 0816     Total CK 73,400 U/L     Basic metabolic panel [[id number]] Collected: 02/20/24 0420    Lab Status: Final result Specimen: Blood from Hand, Right Updated: 02/20/24 0711     Sodium 138 mmol/L      Potassium 3.8 mmol/L      Chloride 106 mmol/L      CO2 29 mmol/L      ANION GAP 3 mmol/L      BUN 7 mg/dL      Creatinine 0.65 mg/dL      Glucose 94 mg/dL      Calcium 8.8 mg/dL      eGFR 141 ml/min/1.73sq m     Narrative:      National Kidney Disease Foundation guidelines for Chronic Kidney Disease (CKD):     Stage 1 with normal or high GFR (GFR > 90 mL/min/1.73 square meters)    Stage 2 Mild CKD (GFR = 60-89 mL/min/1.73 square meters)    Stage 3A Moderate CKD (GFR = 45-59 mL/min/1.73 square meters)    Stage 3B Moderate CKD (GFR = 30-44 mL/min/1.73 square meters)    Stage 4 Severe CKD (GFR = 15-29 mL/min/1.73  square meters)    Stage 5 End Stage CKD (GFR <15 mL/min/1.73 square meters)  Note: GFR calculation is accurate only with a steady state creatinine    Magnesium [[id number]]  (Abnormal) Collected: 02/20/24 0420    Lab Status: Final result Specimen: Blood from Hand, Right Updated: 02/20/24 0711     Magnesium 1.6 mg/dL     CBC and differential [[id number]] Collected: 02/20/24 0420    Lab Status: Final result Specimen: Blood from Hand, Right Updated: 02/20/24 0437     WBC 8.68 Thousand/uL      RBC 4.18 Million/uL      Hemoglobin 12.9 g/dL      Hematocrit 37.7 %      MCV 90 fL      MCH 30.9 pg      MCHC 34.2 g/dL      RDW 13.2 %      MPV 9.9 fL      Platelets 248 Thousands/uL      nRBC 0 /100 WBCs      Neutrophils Relative 66 %      Immat GRANS % 0 %      Lymphocytes Relative 23 %      Monocytes Relative 8 %      Eosinophils Relative 2 %      Basophils Relative 1 %      Neutrophils Absolute 5.82 Thousands/µL      Immature Grans Absolute 0.03 Thousand/uL      Lymphocytes Absolute 1.95 Thousands/µL      Monocytes Absolute 0.69 Thousand/µL      Eosinophils Absolute 0.13 Thousand/µL      Basophils Absolute 0.06 Thousands/µL     Rapid drug screen, urine [[id number]]  (Normal) Collected: 02/19/24 1000    Lab Status: Final result Specimen: Urine, Clean Catch Updated: 02/19/24 1107     Amph/Meth UR Negative     Barbiturate Ur Negative     Benzodiazepine Urine Negative     Cocaine Urine Negative     Methadone Urine Negative     Opiate Urine Negative     PCP Ur Negative     THC Urine Negative     Oxycodone Urine Negative    Narrative:      FOR MEDICAL PURPOSES ONLY.   IF CONFIRMATION NEEDED PLEASE CONTACT THE LAB WITHIN 5 DAYS.    Drug Screen Cutoff Levels:  AMPHETAMINE/METHAMPHETAMINES  1000 ng/mL  BARBITURATES     200 ng/mL  BENZODIAZEPINES     200 ng/mL  COCAINE      300 ng/mL  METHADONE      300 ng/mL  OPIATES      300 ng/mL  PHENCYCLIDINE     25 ng/mL  THC       50 ng/mL  OXYCODONE      100 ng/mL    Mononucleosis screen  [[id number]]  (Normal) Collected: 02/19/24 0207    Lab Status: Final result Specimen: Blood from Arm, Left Updated: 02/19/24 0955     Monotest Negative    CK [[id number]]  (Abnormal) Collected: 02/19/24 0433    Lab Status: Final result Specimen: Blood from Arm, Left Updated: 02/19/24 0551     Total CK 36,550 U/L     Comprehensive metabolic panel [[id number]]  (Abnormal) Collected: 02/19/24 0433    Lab Status: Final result Specimen: Blood from Arm, Left Updated: 02/19/24 0537     Sodium 138 mmol/L      Potassium 3.3 mmol/L      Chloride 106 mmol/L      CO2 25 mmol/L      ANION GAP 7 mmol/L      BUN 13 mg/dL      Creatinine 0.65 mg/dL      Glucose 91 mg/dL      Calcium 7.8 mg/dL      Corrected Calcium 8.3 mg/dL       U/L       U/L      Alkaline Phosphatase 44 U/L      Total Protein 5.1 g/dL      Albumin 3.4 g/dL      Total Bilirubin 0.41 mg/dL      eGFR 141 ml/min/1.73sq m     Narrative:      National Kidney Disease Foundation guidelines for Chronic Kidney Disease (CKD):     Stage 1 with normal or high GFR (GFR > 90 mL/min/1.73 square meters)    Stage 2 Mild CKD (GFR = 60-89 mL/min/1.73 square meters)    Stage 3A Moderate CKD (GFR = 45-59 mL/min/1.73 square meters)    Stage 3B Moderate CKD (GFR = 30-44 mL/min/1.73 square meters)    Stage 4 Severe CKD (GFR = 15-29 mL/min/1.73 square meters)    Stage 5 End Stage CKD (GFR <15 mL/min/1.73 square meters)  Note: GFR calculation is accurate only with a steady state creatinine    Magnesium [[id number]]  (Normal) Collected: 02/19/24 0433    Lab Status: Final result Specimen: Blood from Arm, Left Updated: 02/19/24 0537     Magnesium 1.9 mg/dL     Protime-INR [[id number]]  (Abnormal) Collected: 02/19/24 0433    Lab Status: Final result Specimen: Blood from Arm, Left Updated: 02/19/24 0512     Protime 14.8 seconds      INR 1.17    APTT [[id number]]  (Normal) Collected: 02/19/24 0433    Lab Status: Final result Specimen: Blood from Arm, Left Updated: 02/19/24 0512     PTT  29 seconds     CBC and differential [[id number]] Collected: 02/19/24 0433    Lab Status: Final result Specimen: Blood from Arm, Left Updated: 02/19/24 0454     WBC 10.07 Thousand/uL      RBC 4.12 Million/uL      Hemoglobin 12.8 g/dL      Hematocrit 36.7 %      MCV 89 fL      MCH 31.1 pg      MCHC 34.9 g/dL      RDW 13.1 %      MPV 9.6 fL      Platelets 240 Thousands/uL      nRBC 0 /100 WBCs      Neutrophils Relative 69 %      Immat GRANS % 0 %      Lymphocytes Relative 19 %      Monocytes Relative 9 %      Eosinophils Relative 2 %      Basophils Relative 1 %      Neutrophils Absolute 6.99 Thousands/µL      Immature Grans Absolute 0.04 Thousand/uL      Lymphocytes Absolute 1.88 Thousands/µL      Monocytes Absolute 0.93 Thousand/µL      Eosinophils Absolute 0.15 Thousand/µL      Basophils Absolute 0.08 Thousands/µL     C-reactive protein [[id number]]  (Abnormal) Collected: 02/19/24 0207    Lab Status: Final result Specimen: Blood from Arm, Left Updated: 02/19/24 0359     CRP 10.4 mg/L     Phosphorus [[id number]]  (Normal) Collected: 02/19/24 0207    Lab Status: Final result Specimen: Blood from Arm, Left Updated: 02/19/24 0359     Phosphorus 3.2 mg/dL     Sedimentation rate, automated [[id number]]  (Normal) Collected: 02/19/24 0207    Lab Status: Final result Specimen: Blood from Arm, Left Updated: 02/19/24 0319     Sed Rate 1 mm/hour     TSH, 3rd generation with Free T4 reflex [[id number]]  (Normal) Collected: 02/19/24 0207    Lab Status: Final result Specimen: Blood from Arm, Left Updated: 02/19/24 0303     TSH 3RD GENERATON 1.922 uIU/mL     FLU/RSV/COVID - if FLU/RSV clinically relevant [[id number]]  (Normal) Collected: 02/19/24 0207    Lab Status: Final result Specimen: Nares from Nasopharyngeal Swab Updated: 02/19/24 0302     SARS-CoV-2 Negative     INFLUENZA A PCR Negative     INFLUENZA B PCR Negative     RSV PCR Negative    Narrative:      FOR PEDIATRIC PATIENTS - copy/paste COVID Guidelines URL to browser:  [email or web address]    SARS-CoV-2 assay is a Nucleic Acid Amplification assay intended for the  qualitative detection of nucleic acid from SARS-CoV-2 in nasopharyngeal  swabs. Results are for the presumptive identification of SARS-CoV-2 RNA.    Positive results are indicative of infection with SARS-CoV-2, the virus  causing COVID-19, but do not rule out bacterial infection or co-infection  with other viruses. Laboratories within the [place] and its  territories are required to report all positive results to the appropriate  public health authorities. Negative results do not preclude SARS-CoV-2  infection and should not be used as the sole basis for treatment or other  patient management decisions. Negative results must be combined with  clinical observations, patient history, and epidemiological information.  This test has not been FDA cleared or approved.    This test has been authorized by FDA under an Emergency Use Authorization  (EUA). This test is only authorized for the duration of time the  declaration that circumstances exist justifying the authorization of the  emergency use of an in vitro diagnostic tests for detection of SARS-CoV-2  virus and/or diagnosis of COVID-19 infection under section 564(b)(1) of  the Act, 21 U.S.C. 360bbb-3(b)(1), unless the authorization is terminated  or revoked sooner. The test has been validated but independent review by FDA  and CLIA is pending.    Test performed using [employer] GeneXpert: This RT-PCR assay targets N2,  a region unique to SARS-CoV-2. A conserved region in the E-gene was chosen  for pan-Sarbecovirus detection which includes SARS-CoV-2.    According to CMS-2020-01-R, this platform meets the definition of high-throughput technology.    Protime-INR [[id number]]  (Normal) Collected: 02/19/24 0207    Lab Status: Final result Specimen: Blood from Arm, Left Updated: 02/19/24 0246     Protime 14.5 seconds       INR 1.14    APTT [[id number]]  (Normal) Collected: 02/19/24 0207    Lab Status: Final result Specimen: Blood from Arm, Left Updated: 02/19/24 0246     PTT 30 seconds     CK [[id number]]  (Abnormal) Collected: 02/18/24 2233    Lab Status: Final result Specimen: Blood from Arm, Left Updated: 02/19/24 0032     Total CK 60,550 U/L     Comprehensive metabolic panel [[id number]]  (Abnormal) Collected: 02/18/24 2233    Lab Status: Final result Specimen: Blood from Arm, Left Updated: 02/19/24 0015     Sodium 138 mmol/L      Potassium 3.7 mmol/L      Chloride 104 mmol/L      CO2 25 mmol/L      ANION GAP 9 mmol/L      BUN 21 mg/dL      Creatinine 0.80 mg/dL      Glucose 121 mg/dL      Calcium 8.8 mg/dL       U/L       U/L      Alkaline Phosphatase 55 U/L      Total Protein 6.4 g/dL      Albumin 4.2 g/dL      Total Bilirubin 0.71 mg/dL      eGFR 129 ml/min/1.73sq m     Narrative:      National Kidney Disease Foundation guidelines for Chronic Kidney Disease (CKD):     Stage 1 with normal or high GFR (GFR > 90 mL/min/1.73 square meters)    Stage 2 Mild CKD (GFR = 60-89 mL/min/1.73 square meters)    Stage 3A Moderate CKD (GFR = 45-59 mL/min/1.73 square meters)    Stage 3B Moderate CKD (GFR = 30-44 mL/min/1.73 square meters)    Stage 4 Severe CKD (GFR = 15-29 mL/min/1.73 square meters)    Stage 5 End Stage CKD (GFR <15 mL/min/1.73 square meters)  Note: GFR calculation is accurate only with a steady state creatinine    Urine Microscopic [[id number]]  (Abnormal) Collected: 02/18/24 2239    Lab Status: Final result Specimen: Urine, Clean Catch Updated: 02/18/24 2256     RBC, UA None Seen /hpf      WBC, UA 1-2 /hpf      Epithelial Cells None Seen /hpf      Bacteria, UA None Seen /hpf      MUCUS THREADS Occasional    UA w Reflex to Microscopic w Reflex to Culture [[id number]]  (Abnormal) Collected: 02/18/24 2239    Lab Status: Final result Specimen: Urine, Clean Catch Updated: 02/18/24 2255     Color, UA Light Orange      "Clarity, UA Clear     Specific Gravity, UA 1.026     pH, UA 6.0     Leukocytes, UA Negative     Nitrite, UA Negative     Protein,  (2+) mg/dl      Glucose, UA Negative mg/dl      Ketones, UA 80 (3+) mg/dl      Urobilinogen, UA 2.0 mg/dl      Bilirubin, UA Negative     Occult Blood, UA Large    CBC and differential [[id number]]  (Abnormal) Collected: 02/18/24 2233    Lab Status: Final result Specimen: Blood from Arm, Left Updated: 02/18/24 2242     WBC 14.45 Thousand/uL      RBC 4.87 Million/uL      Hemoglobin 15.2 g/dL      Hematocrit 42.0 %      MCV 86 fL      MCH 31.2 pg      MCHC 36.2 g/dL      RDW 13.0 %      MPV 9.4 fL      Platelets 307 Thousands/uL      nRBC 0 /100 WBCs      Neutrophils Relative 80 %      Immat GRANS % 0 %      Lymphocytes Relative 12 %      Monocytes Relative 6 %      Eosinophils Relative 1 %      Basophils Relative 1 %      Neutrophils Absolute 11.62 Thousands/µL      Immature Grans Absolute 0.05 Thousand/uL      Lymphocytes Absolute 1.66 Thousands/µL      Monocytes Absolute 0.92 Thousand/µL      Eosinophils Absolute 0.13 Thousand/µL      Basophils Absolute 0.07 Thousands/µL                    XR chest portable   Final Result by Kris Crowder MD (02/20 0843)      No active pulmonary disease.            Workstation performed: [id number]         VAS upper limb venous duplex scan, complete, bilateral   Final Result by Chato Varner MD (02/19 1310)            Procedures  Procedures      ED Course         CRAFFT      Flowsheet Row Most Recent Value   CRAFFT Initial Screen: During the past 12 months, did you:    1. Drink any alcohol (more than a few sips)?  No Filed at: 02/19/2024 0240   2. Smoke any marijuana or hashish No Filed at: 02/19/2024 0240   3. Use anything else to get high? (\"anything else\" includes illegal drugs, over the counter and prescription drugs, and things that you sniff or 'hernandez')? No Filed at: 02/19/2024 0240                                      Medical Decision " Making  18-year-old male no significant past medical history presents ED complaining of 1 day of bilateral upper extremity swelling.     DDx: Rhabdomyolysis versus compartment syndrome versus inflammatory myositis.    Patient with significant tense swelling of the bilateral upper extremities however minimal to no pain, neurovascularly intact.  Clinically suspect rhabdomyolysis, unlikely compartment syndrome in the setting of no pain.    Labs significant for significantly elevated CK, AST and ALT however normal kidney function.  Will aggressively hydrate and plan to admit medically for continued treatment and evaluation of rhabdomyolysis.    Amount and/or Complexity of Data Reviewed  Labs: ordered.    Risk  Prescription drug management.  Decision regarding hospitalization.          Disposition  Final diagnoses:   Rhabdomyolysis   Elevated LFTs     Time reflects when diagnosis was documented in both MDM as applicable and the Disposition within this note       Time User Action Codes Description Comment    2/19/2024 12:39 AM Bud Liu [M62.82] Rhabdomyolysis     2/19/2024 12:39 AM Bud Liu [R79.89] Elevated LFTs     2/19/2024  1:21 AM Oliverio Chan [M79.89] Arm swelling           ED Disposition       ED Disposition   Admit    Condition   Stable    Date/Time   Mon Feb 19, 2024 0039    Comment   Case was discussed with medicine and the patient's admission status was agreed to be Admission Status: inpatient status                Follow-up Information    None         There are no discharge medications for this patient.    No discharge procedures on file.    PDMP Review       None             ED Provider  Attending physically available and evaluated Michael Chan. I managed the patient along with the ED Attending.    Electronically Signed by           Rhea Tim MD  02/22/24 1871

## 2024-02-22 NOTE — PROGRESS NOTES
"Progress Note - General Surgery   Michael Chan 18 y.o. male MRN: [id number]  Unit/Bed#: [facility] 816-01 Encounter: [id number]    Assessment:  Exercise induced rhabdomyolysis   Bilateral upper extremity swelling  Rising CK after decreased rate in IVF    Plan:  No evidence of compartment syndrome with no pain and minimal sensation of tightness in the compartments. Non tender to palp over bilateral upper extremity compartments, ROM intact and no paresthesias.   Continue IVF per primary team    Subjective/Objective   Chief Complaint: \"I feel the same\"    Subjective: Patient reports his upper extremities feel the same today, he denies pain, only reports a slight sensation of tightness in his upper arms. He is able to use his arms without difficulty and denies numbness or tingling.     Objective:      Blood pressure 135/74, pulse 55, temperature 97.9 °F (36.6 °C), resp. rate 18, height 5' 9.5\" (1.765 m), weight 78.9 kg (174 lb), SpO2 98%.,Body mass index is 25.33 kg/m².      Intake/Output Summary (Last 24 hours) at 2/22/2024 0925  Last data filed at 2/22/2024 0529  Gross per 24 hour   Intake 4100 ml   Output 6600 ml   Net -2500 ml       Invasive Devices       Peripheral Intravenous Line  Duration             Peripheral IV 02/19/24 Dorsal (posterior);Left Hand 3 days    Peripheral IV 02/20/24 Right;Ventral (anterior) Hand 2 days                    Physical Exam: /74   Pulse 55   Temp 97.9 °F (36.6 °C)   Resp 18   Ht 5' 9.5\" (1.765 m)   Wt 78.9 kg (174 lb)   SpO2 98%   BMI 25.33 kg/m²     General Appearance:    Alert, cooperative, no distress, appears stated age   Head:    Normocephalic, without obvious abnormality, atraumatic   Lungs:     Clear to auscultation bilaterally, respirations unlabored   Chest wall:    No tenderness or deformity   Heart:    Regular rate and rhythm, S1 and S2 normal, no murmur, rub   or gallop   Abdomen:     Soft, non-tender, bowel sounds active all four quadrants,     no masses, no " "organomegaly   Extremities: Bilateral upper extremities without tenderness, tightness over the bilateral tricep and deltoids without pain. Active ROM intact without pain. DVNI   Skin:   Skin color, texture, turgor normal, no rashes or lesions               Lab, Imaging and other studies:CBC: No results found for: \"WBC\", \"HGB\", \"HCT\", \"MCV\", \"PLT\", \"ADJUSTEDWBC\", \"RBC\", \"MCH\", \"MCHC\", \"RDW\", \"MPV\", \"NRBC\", CMP:   Lab Results   Component Value Date    SODIUM 140 02/22/2024    K 3.8 02/22/2024     02/22/2024    CO2 28 02/22/2024    BUN 12 02/22/2024    CREATININE 0.60 02/22/2024    CALCIUM 9.0 02/22/2024    EGFR 145 02/22/2024     VTE Pharmacologic Prophylaxis: Reason for no pharmacologic prophylaxis Low risk  VTE Mechanical Prophylaxis: sequential compression device  "

## 2024-02-23 LAB
ANION GAP SERPL CALCULATED.3IONS-SCNC: 8 MMOL/L
BACTERIA UR QL AUTO: NORMAL /HPF
BILIRUB UR QL STRIP: NEGATIVE
BUN SERPL-MCNC: 9 MG/DL (ref 5–25)
CALCIUM SERPL-MCNC: 9.1 MG/DL (ref 8.4–10.2)
CHLORIDE SERPL-SCNC: 103 MMOL/L (ref 96–108)
CK SERPL-CCNC: ABNORMAL U/L (ref 39–308)
CK SERPL-CCNC: ABNORMAL U/L (ref 39–308)
CLARITY UR: CLEAR
CO2 SERPL-SCNC: 27 MMOL/L (ref 21–32)
COLOR UR: COLORLESS
CREAT SERPL-MCNC: 0.58 MG/DL (ref 0.6–1.3)
GFR SERPL CREATININE-BSD FRML MDRD: 147 ML/MIN/1.73SQ M
GLUCOSE SERPL-MCNC: 84 MG/DL (ref 65–140)
GLUCOSE UR STRIP-MCNC: NEGATIVE MG/DL
HGB UR QL STRIP.AUTO: NEGATIVE
KETONES UR STRIP-MCNC: NEGATIVE MG/DL
LEUKOCYTE ESTERASE UR QL STRIP: NEGATIVE
NITRITE UR QL STRIP: NEGATIVE
NON-SQ EPI CELLS URNS QL MICRO: NORMAL /HPF
PH UR STRIP.AUTO: 7.5 [PH]
POTASSIUM SERPL-SCNC: 3.9 MMOL/L (ref 3.5–5.3)
PROT UR STRIP-MCNC: NEGATIVE MG/DL
RBC #/AREA URNS AUTO: NORMAL /HPF
SODIUM SERPL-SCNC: 138 MMOL/L (ref 135–147)
SP GR UR STRIP.AUTO: 1.01 (ref 1–1.03)
UROBILINOGEN UR STRIP-ACNC: <2 MG/DL
WBC #/AREA URNS AUTO: NORMAL /HPF

## 2024-02-23 PROCEDURE — 81001 URINALYSIS AUTO W/SCOPE: CPT | Performed by: STUDENT IN AN ORGANIZED HEALTH CARE EDUCATION/TRAINING PROGRAM

## 2024-02-23 PROCEDURE — 82550 ASSAY OF CK (CPK): CPT | Performed by: PHYSICIAN ASSISTANT

## 2024-02-23 PROCEDURE — 99232 SBSQ HOSP IP/OBS MODERATE 35: CPT | Performed by: STUDENT IN AN ORGANIZED HEALTH CARE EDUCATION/TRAINING PROGRAM

## 2024-02-23 PROCEDURE — 80048 BASIC METABOLIC PNL TOTAL CA: CPT | Performed by: INTERNAL MEDICINE

## 2024-02-23 RX ORDER — FUROSEMIDE 10 MG/ML
20 INJECTION INTRAMUSCULAR; INTRAVENOUS ONCE
Status: COMPLETED | OUTPATIENT
Start: 2024-02-23 | End: 2024-02-23

## 2024-02-23 RX ADMIN — SODIUM CHLORIDE, SODIUM LACTATE, POTASSIUM CHLORIDE, AND CALCIUM CHLORIDE 500 ML/HR: .6; .31; .03; .02 INJECTION, SOLUTION INTRAVENOUS at 03:55

## 2024-02-23 RX ADMIN — FUROSEMIDE 20 MG: 10 INJECTION, SOLUTION INTRAMUSCULAR; INTRAVENOUS at 11:47

## 2024-02-23 RX ADMIN — SODIUM CHLORIDE, SODIUM LACTATE, POTASSIUM CHLORIDE, AND CALCIUM CHLORIDE 250 ML/HR: .6; .31; .03; .02 INJECTION, SOLUTION INTRAVENOUS at 11:37

## 2024-02-23 RX ADMIN — SODIUM CHLORIDE, SODIUM LACTATE, POTASSIUM CHLORIDE, AND CALCIUM CHLORIDE 500 ML/HR: .6; .31; .03; .02 INJECTION, SOLUTION INTRAVENOUS at 05:54

## 2024-02-23 RX ADMIN — SODIUM CHLORIDE, SODIUM LACTATE, POTASSIUM CHLORIDE, AND CALCIUM CHLORIDE 300 ML/HR: .6; .31; .03; .02 INJECTION, SOLUTION INTRAVENOUS at 00:55

## 2024-02-23 NOTE — PLAN OF CARE
Problem: PAIN - ADULT  Goal: Verbalizes/displays adequate comfort level or baseline comfort level  Description: Interventions:  - Encourage patient to monitor pain and request assistance  - Assess pain using appropriate pain scale  - Administer analgesics based on type and severity of pain and evaluate response  - Implement non-pharmacological measures as appropriate and evaluate response  - Consider cultural and social influences on pain and pain management  - Notify physician/advanced practitioner if interventions unsuccessful or patient reports new pain  Outcome: Progressing     Problem: INFECTION - ADULT  Goal: Absence or prevention of progression during hospitalization  Description: INTERVENTIONS:  - Assess and monitor for signs and symptoms of infection  - Monitor lab/diagnostic results  - Monitor all insertion sites, i.e. indwelling lines, tubes, and drains  - Monitor endotracheal if appropriate and nasal secretions for changes in amount and color  - [place] appropriate cooling/warming therapies per order  - Administer medications as ordered  - Instruct and encourage patient and family to use good hand hygiene technique  - Identify and instruct in appropriate isolation precautions for identified infection/condition  Outcome: Progressing  Goal: Absence of fever/infection during neutropenic period  Description: INTERVENTIONS:  - Monitor WBC    Outcome: Progressing

## 2024-02-23 NOTE — PROGRESS NOTES
"[facility]  Progress Note  Name: Michael Chan I  MRN: [id number]  Unit/Bed#: [facility] 816-01 I Date of Admission: 2/18/2024   Date of Service: 2/23/2024 I Hospital Day: 4    Assessment/Plan   * Rhabdomyolysis  Assessment & Plan  Reported bilateral upper extremity \"tightness\" that began about 8 hours after he exercised at the [employer] gym this past Thursday; reported waking up the next morning and noticing that both of his arms were significantly swollen although not painful.  Reported that swelling worsened prompting him to come to the ER.  Patient reported that he did use weights including performing dumbbell bicep curls as well as bench press; reported that last time that he worked out similar to this was in November.  Denied that the workout was far in excess of what he has done in the past and reported that he was at the gym about 45 minutes  CK significantly elevated: 60,550, initially improved to 36,550, at which point IVF rate was decreased, however CK then elevated to 73,400 and IVF rate was increased again. Subsequent CKs have improved: 61,739 --> 30,152 and Nephrology recommended to decrease LR to 250cc/hr. Subsequent CK worsening to 64,270 which is now again downtrending to 31,212 and nephrology recommend increasing LR to 300 cc/h.  Appreciate ongoing Nephrology recs  Discussed with rheumatology on 2/22 who reports that patient's symptoms do not align with dermatomyositis or polymyositis given acute onset nature, especially given downtrending CKs without any immunosuppressive agents.   Discussed with neurology on 2/22 who also reports that patient's symptoms do not align with neuromuscular myopathies and recommend continuing IV hydration and trending CK.  If no significant improvement, could consider possible EMG.   General surgery evaluated patient again on 2/22 who reported no evidence of compartment syndrome.  Creatinine remains within normal " limits  Noted mild leukocytosis of 14.0 on admission, resolved, no clear infectious etiology identified and being monitored off antibiotics at this time   Bilateral venous ultrasounds of the upper extremities was Negative for DVT per the results report  2/23:   Patient with fluctuating CK levels of unclear etiology.  Possibly due to use of tourniquet as patient reports that yesterday evening CK was obtained without tourniquet and it was reported at 6900.  CK elevated this morning at 24,000 however down from yesterday of 35,000 after tourniquet was used again.  Will obtain CK without use of tourniquet to further assess.  If no significant improvement over the next 24-48 hours will consider discussion again with rheumatology and neurology for further evaluation.    Hypomagnesemia  Assessment & Plan  Repleted, continue to monitor    Hypokalemia  Assessment & Plan  Resolved with repletion, monitor       Swelling of arm  Assessment & Plan  See rhabdo section above  Lasix today to help with swelling           VTE Pharmacologic Prophylaxis: VTE Score: 2 Low Risk (Score 0-2) - Encourage Ambulation.    Mobility:   Basic Mobility Inpatient Raw Score: 23  JH-HLM Goal: 7: Walk 25 feet or more  JH-HLM Achieved: 7: Walk 25 feet or more  HLM Goal achieved. Continue to encourage appropriate mobility.    Patient Centered Rounds: I performed bedside rounds with nursing staff today.   Discussions with Specialists or Other Care Team Provider: nephrology    Education and Discussions with Family / Patient: Updated  (mother) via phone.    Total Time Spent on Date of Encounter in care of patient: 25 mins. This time was spent on one or more of the following: performing physical exam; counseling and coordination of care; obtaining or reviewing history; documenting in the medical record; reviewing/ordering tests, medications or procedures; communicating with other healthcare professionals and discussing with patient's  family/caregivers.    Current Length of Stay: 4 day(s)  Current Patient Status: Inpatient   Certification Statement: The patient will continue to require additional inpatient hospital stay due to fluids, rhabdomyolysis and CK elevation  Discharge Plan: Anticipate discharge in 48-72 hrs to home.    Code Status: Level 1 - Full Code    Subjective:   Patient assessed at bedside.  No complaints.  Reports swelling in the arms is slightly improved today.    Objective:     Vitals:   Temp (24hrs), Av °F (36.7 °C), Min:98 °F (36.7 °C), Max:98 °F (36.7 °C)    Temp:  [98 °F (36.7 °C)] 98 °F (36.7 °C)  HR:  [59-75] 75  Resp:  [16-20] 20  BP: (128-135)/(74-75) 128/74  SpO2:  [97 %-99 %] 97 %  Body mass index is 25.33 kg/m².     Input and Output Summary (last 24 hours):     Intake/Output Summary (Last 24 hours) at 2024 1507  Last data filed at 2024 1353  Gross per 24 hour   Intake 7334.67 ml   Output 4600 ml   Net 2734.67 ml       Physical Exam:   Physical Exam  Vitals reviewed.   Constitutional:       General: He is not in acute distress.     Appearance: Normal appearance. He is not ill-appearing.   HENT:      Head: Normocephalic and atraumatic.   Cardiovascular:      Rate and Rhythm: Normal rate and regular rhythm.      Heart sounds: Normal heart sounds.   Pulmonary:      Effort: Pulmonary effort is normal. No respiratory distress.   Abdominal:      General: Bowel sounds are normal.      Tenderness: There is no abdominal tenderness.   Musculoskeletal:         General: Swelling (Bilateral upper extremity mild swelling) present.      Right lower leg: No edema.      Left lower leg: No edema.   Skin:     General: Skin is warm and dry.   Neurological:      Mental Status: He is alert and oriented to person, place, and time. Mental status is at baseline.   Psychiatric:         Mood and Affect: Mood normal.         Behavior: Behavior normal.        Additional Data:     Labs:  Results from last 7 days   Lab Units  02/21/24  0533 02/20/24  0420   WBC Thousand/uL 7.49 8.68   HEMOGLOBIN g/dL 11.7* 12.9   HEMATOCRIT % 32.7* 37.7   PLATELETS Thousands/uL 210 248   NEUTROS PCT %  --  66   LYMPHS PCT %  --  23   MONOS PCT %  --  8   EOS PCT %  --  2     Results from last 7 days   Lab Units 02/23/24  0849 02/22/24  0528 02/21/24  0533   SODIUM mmol/L 138   < > 139   POTASSIUM mmol/L 3.9   < > 3.8   CHLORIDE mmol/L 103   < > 106   CO2 mmol/L 27   < > 26   BUN mg/dL 9   < > 9   CREATININE mg/dL 0.58*   < > 0.56*   ANION GAP mmol/L 8   < > 7   CALCIUM mg/dL 9.1   < > 8.5   ALBUMIN g/dL  --   --  3.2*   TOTAL BILIRUBIN mg/dL  --   --  0.28   ALK PHOS U/L  --   --  41   ALT U/L  --   --  182*   AST U/L  --   --  559*   GLUCOSE RANDOM mg/dL 84   < > 121    < > = values in this interval not displayed.     Results from last 7 days   Lab Units 02/19/24  0433   INR  1.17     Results from last 7 days   Lab Units 02/21/24  0733   POC GLUCOSE mg/dl 101               Lines/Drains:  Invasive Devices       Peripheral Intravenous Line  Duration             Peripheral IV 02/23/24 Dorsal (posterior);Left Forearm <1 day                          Imaging: No pertinent imaging reviewed.    Recent Cultures (last 7 days):   Results from last 7 days   Lab Units 02/19/24  0211 02/19/24  0207   BLOOD CULTURE  No Growth After 4 Days. No Growth After 4 Days.       Last 24 Hours Medication List:   Current Facility-Administered Medications   Medication Dose Route Frequency Provider Last Rate    lactated ringers  100 mL/hr Intravenous Continuous Joselyn Reyes Bahamonde,  mL/hr (02/23/24 1147)        Today, Patient Was Seen By: Yann Katz DO    **Please Note: This note may have been constructed using a voice recognition system.**

## 2024-02-23 NOTE — ASSESSMENT & PLAN NOTE
"Reported bilateral upper extremity \"tightness\" that began about 8 hours after he exercised at the [employer] gym this past Thursday; reported waking up the next morning and noticing that both of his arms were significantly swollen although not painful.  Reported that swelling worsened prompting him to come to the ER.  Patient reported that he did use weights including performing dumbbell bicep curls as well as bench press; reported that last time that he worked out similar to this was in November.  Denied that the workout was far in excess of what he has done in the past and reported that he was at the gym about 45 minutes  CK significantly elevated: 60,550, initially improved to 36,550, at which point IVF rate was decreased, however CK then elevated to 73,400 and IVF rate was increased again. Subsequent CKs have improved: 61,739 --> 30,152 and Nephrology recommended to decrease LR to 250cc/hr. Subsequent CK worsening to 64,270 which is now again downtrending to 31,212 and nephrology recommend increasing LR to 300 cc/h.  Appreciate ongoing Nephrology recs  Discussed with rheumatology on 2/22 who reports that patient's symptoms do not align with dermatomyositis or polymyositis given acute onset nature, especially given downtrending CKs without any immunosuppressive agents.   Discussed with neurology on 2/22 who also reports that patient's symptoms do not align with neuromuscular myopathies and recommend continuing IV hydration and trending CK.  If no significant improvement, could consider possible EMG.   General surgery evaluated patient again on 2/22 who reported no evidence of compartment syndrome.  Creatinine remains within normal limits  Noted mild leukocytosis of 14.0 on admission, resolved, no clear infectious etiology identified and being monitored off antibiotics at this time   Bilateral venous ultrasounds of the upper extremities was Negative for DVT per the results report  2/23:   Patient with " fluctuating CK levels of unclear etiology.  Possibly due to use of tourniquet as patient reports that yesterday evening CK was obtained without tourniquet and it was reported at 6900.  CK elevated this morning at 24,000 however down from yesterday of 35,000 after tourniquet was used again.  Will obtain CK without use of tourniquet to further assess.  If no significant improvement over the next 24-48 hours will consider discussion again with rheumatology and neurology for further evaluation.

## 2024-02-23 NOTE — ASSESSMENT & PLAN NOTE
"Reported bilateral upper extremity \"tightness\" that began about 8 hours after he exercised at the [employer] gym this past Thursday; reported waking up the next morning and noticing that both of his arms were significantly swollen although not painful.  Reported that swelling worsened prompting him to come to the ER.  Patient reported that he did use weights including performing dumbbell bicep curls as well as bench press; reported that last time that he worked out similar to this was in November.  Denied that the workout was far in excess of what he has done in the past and reported that he was at the gym about 45 minutes  CK significantly elevated: 60,550, initially improved to 36,550, at which point IVF rate was decreased, however CK then elevated to 73,400 and IVF rate was increased again. Subsequent CKs have improved: 61,739 --> 30,152 and Nephrology recommended to decrease LR to 250cc/hr. Subsequent CK worsening to 64,270 which is now again downtrending to 31,212 and nephrology recommend increasing LR to 300 cc/h.  Appreciate ongoing Nephrology recs  Discussed with rheumatology on 2/22 who reports that patient's symptoms do not align with dermatomyositis or polymyositis given acute onset nature, especially given downtrending CKs without any immunosuppressive agents.   Discussed with neurology on 2/22 who also reports that patient's symptoms do not align with neuromuscular myopathies and recommend continuing IV hydration and trending CK.  If no significant improvement, could consider possible EMG.   General surgery evaluated patient again on 2/22 who reported no evidence of compartment syndrome.  Creatinine remains within normal limits  Noted mild leukocytosis of 14.0 on admission, resolved, no clear infectious etiology identified and being monitored off antibiotics at this time   Bilateral venous ultrasounds of the upper extremities was Negative for DVT per the results report  2/24: CK trending down to " 16,000 today.  Will repeat CK in the morning without tourniquet.  Will discontinue IVF today.  IV Lasix given to help with edema by nephrology.  Encourage oral hydration.  If CK levels trending down, will hold off on further inpatient management. If no significant improvement over the next 24-48 hours will consider discussion again with rheumatology and neurology for further evaluation.

## 2024-02-23 NOTE — PLAN OF CARE
Problem: PAIN - ADULT  Goal: Verbalizes/displays adequate comfort level or baseline comfort level  Description: Interventions:  - Encourage patient to monitor pain and request assistance  - Assess pain using appropriate pain scale  - Administer analgesics based on type and severity of pain and evaluate response  - Implement non-pharmacological measures as appropriate and evaluate response  - Consider cultural and social influences on pain and pain management  - Notify physician/advanced practitioner if interventions unsuccessful or patient reports new pain  Outcome: Progressing     Problem: INFECTION - ADULT  Goal: Absence or prevention of progression during hospitalization  Description: INTERVENTIONS:  - Assess and monitor for signs and symptoms of infection  - Monitor lab/diagnostic results  - Monitor all insertion sites, i.e. indwelling lines, tubes, and drains  - Monitor endotracheal if appropriate and nasal secretions for changes in amount and color  - [place] appropriate cooling/warming therapies per order  - Administer medications as ordered  - Instruct and encourage patient and family to use good hand hygiene technique  - Identify and instruct in appropriate isolation precautions for identified infection/condition  Outcome: Progressing

## 2024-02-23 NOTE — PROGRESS NOTES
NEPHROLOGY PROGRESS NOTE   Michael Chan 18 y.o. male MRN: [id number]  Unit/Bed#: [facility] 816-01 Encounter: [id number]  Reason for Consult: Rhabdomyolysis    ASSESSMENT AND PLAN:  17 yo with no significant past medical history p/w progressive worsening bilateral upper extremity edema.  Patient was found to have rhabdomyolysis.  Nephrology is consulted for management of ANIKET prevention    PLAN:    # Rhabdomyolysis  CK on admission 60,000  Fluctuates  Last   Thought to be secondary to extreme exercise  Unclear why CK fluctuates   management as per primary team  Primary team discussed case with rheumatology they are not concerned of dermatomyositis or polymyositis at this time      # Prevention of ANIKET in the settings of rhabdomyolysis  Patient was started on IV fluids to prevent precipitation of myoglobin  Recommend decrease IV fluids to 250 ml in the morning and further decrease to 100 mL this afternoon  Will give Lasix 20 mg in the settings of upper extremity edema  Monitor urinary output  Daily weight  No evidence of acute tubular injury at this time  Plan to repeat CK  tomorrow a.m. t    Rhe highlighted and/or bolded points in my assessment, plan, and disposition were discussed with the primary team and they agree with those points and the plan.  Previous records were personally reviewed by me to obtain a baseline creatinine.   The images (CXR) were personally reviewed by me in PACS      SUBJECTIVE:  Patient seen and examined at bedside.  Patient complains of upper extremity edema and tenderness.  No chest pain, shortness of breath, nausea, vomiting, abdominal pain or diarrhea.     OBJECTIVE:  Current Weight: Weight - Scale: 78.9 kg (174 lb)  Vitals:    02/23/24 1149   BP: 130/74   Pulse: 75   Resp:    Temp:    SpO2: 97%       Intake/Output Summary (Last 24 hours) at 2/23/2024 1417  Last data filed at 2/23/2024 1353  Gross per 24 hour   Intake 7334.67 ml   Output 6200 ml   Net 1134.67 ml     Wt Readings  from Last 3 Encounters:   02/21/24 78.9 kg (174 lb) (75%, Z= 0.67)*     * Growth percentiles are based on CDC (Boys, 2-20 Years) data.     Temp Readings from Last 3 Encounters:   02/22/24 98 °F (36.7 °C)     BP Readings from Last 3 Encounters:   02/23/24 130/74     Pulse Readings from Last 3 Encounters:   02/23/24 75        General:  no acute distress at this time  Skin:  No acute rash  Eyes:  No scleral icterus and noninjected  ENT:  mucous membranes moist  Neck:  no carotid bruits  Chest:  Clear to auscultation percussion, good respiratory effort, no use of accessory respiratory muscles  CVS:  Regular rate and rhythm without rub   Abdomen:  soft and nontender   Extremities: Upper extremity edema   neuro:  No gross focality  Psych:  Alert , cooperative       Medications:    Current Facility-Administered Medications:     lactated ringers infusion, 100 mL/hr, Intravenous, Continuous, Joselyn Reyes Bahamonde, MD, Last Rate: 100 mL/hr at 02/23/24 1147, 100 mL/hr at 02/23/24 1147    Laboratory Results:  Results from last 7 days   Lab Units 02/23/24  0849 02/22/24  0528 02/21/24  0533 02/20/24  0420 02/19/24  0433 02/19/24  0207 02/18/24  2233   WBC Thousand/uL  --   --  7.49 8.68 10.07  --  14.45*   HEMOGLOBIN g/dL  --   --  11.7* 12.9 12.8  --  15.2   HEMATOCRIT %  --   --  32.7* 37.7 36.7  --  42.0   PLATELETS Thousands/uL  --   --  210 248 240  --  307   SODIUM mmol/L 138 140 139 138 138  --  138   POTASSIUM mmol/L 3.9 3.8 3.8 3.8 3.3*  --  3.7   CHLORIDE mmol/L 103 105 106 106 106  --  104   CO2 mmol/L 27 28 26 29 25  --  25   BUN mg/dL 9 12 9 7 13  --  21   CREATININE mg/dL 0.58* 0.60 0.56* 0.65 0.65  --  0.80   CALCIUM mg/dL 9.1 9.0 8.5 8.8 7.8*  --  8.8   MAGNESIUM mg/dL  --  1.7* 1.7* 1.6* 1.9  --   --    PHOSPHORUS mg/dL  --   --   --   --   --  3.2  --        XR chest portable   Final Result by Kris Crowder MD (02/20 0843)      No active pulmonary disease.            Workstation performed: [id number]     "     VAS upper limb venous duplex scan, complete, bilateral   Final Result by Chato Varner MD (02/19 1310)          Portions of the record may have been created with voice recognition software. Occasional wrong word or \"sound a like\" substitutions may have occurred due to the inherent limitations of voice recognition software. Read the chart carefully and recognize, using context, where substitutions have occurred.    "

## 2024-02-24 LAB
ANION GAP SERPL CALCULATED.3IONS-SCNC: 7 MMOL/L
BACTERIA BLD CULT: NORMAL
BACTERIA BLD CULT: NORMAL
BASOPHILS # BLD AUTO: 0.07 THOUSANDS/ÂΜL (ref 0–0.1)
BASOPHILS NFR BLD AUTO: 1 % (ref 0–1)
BUN SERPL-MCNC: 15 MG/DL (ref 5–25)
CALCIUM SERPL-MCNC: 8.7 MG/DL (ref 8.4–10.2)
CHLORIDE SERPL-SCNC: 104 MMOL/L (ref 96–108)
CK SERPL-CCNC: ABNORMAL U/L (ref 39–308)
CO2 SERPL-SCNC: 26 MMOL/L (ref 21–32)
CREAT SERPL-MCNC: 0.73 MG/DL (ref 0.6–1.3)
EOSINOPHIL # BLD AUTO: 0.1 THOUSAND/ÂΜL (ref 0–0.61)
EOSINOPHIL NFR BLD AUTO: 1 % (ref 0–6)
ERYTHROCYTE [DISTWIDTH] IN BLOOD BY AUTOMATED COUNT: 12.9 % (ref 11.6–15.1)
GFR SERPL CREATININE-BSD FRML MDRD: 134 ML/MIN/1.73SQ M
GLUCOSE SERPL-MCNC: 87 MG/DL (ref 65–140)
HCT VFR BLD AUTO: 36.5 % (ref 36.5–49.3)
HGB BLD-MCNC: 13.2 G/DL (ref 12–17)
IMM GRANULOCYTES # BLD AUTO: 0.02 THOUSAND/UL (ref 0–0.2)
IMM GRANULOCYTES NFR BLD AUTO: 0 % (ref 0–2)
LYMPHOCYTES # BLD AUTO: 1.64 THOUSANDS/ÂΜL (ref 0.6–4.47)
LYMPHOCYTES NFR BLD AUTO: 23 % (ref 14–44)
MAGNESIUM SERPL-MCNC: 1.7 MG/DL (ref 1.9–2.7)
MCH RBC QN AUTO: 31.4 PG (ref 26.8–34.3)
MCHC RBC AUTO-ENTMCNC: 36.2 G/DL (ref 31.4–37.4)
MCV RBC AUTO: 87 FL (ref 82–98)
MONOCYTES # BLD AUTO: 0.46 THOUSAND/ÂΜL (ref 0.17–1.22)
MONOCYTES NFR BLD AUTO: 6 % (ref 4–12)
NEUTROPHILS # BLD AUTO: 4.89 THOUSANDS/ÂΜL (ref 1.85–7.62)
NEUTS SEG NFR BLD AUTO: 69 % (ref 43–75)
NRBC BLD AUTO-RTO: 0 /100 WBCS
PLATELET # BLD AUTO: 255 THOUSANDS/UL (ref 149–390)
PMV BLD AUTO: 10 FL (ref 8.9–12.7)
POTASSIUM SERPL-SCNC: 3.7 MMOL/L (ref 3.5–5.3)
RBC # BLD AUTO: 4.2 MILLION/UL (ref 3.88–5.62)
SODIUM SERPL-SCNC: 137 MMOL/L (ref 135–147)
WBC # BLD AUTO: 7.18 THOUSAND/UL (ref 4.31–10.16)

## 2024-02-24 PROCEDURE — 85025 COMPLETE CBC W/AUTO DIFF WBC: CPT | Performed by: STUDENT IN AN ORGANIZED HEALTH CARE EDUCATION/TRAINING PROGRAM

## 2024-02-24 PROCEDURE — 82550 ASSAY OF CK (CPK): CPT | Performed by: STUDENT IN AN ORGANIZED HEALTH CARE EDUCATION/TRAINING PROGRAM

## 2024-02-24 PROCEDURE — 99232 SBSQ HOSP IP/OBS MODERATE 35: CPT | Performed by: STUDENT IN AN ORGANIZED HEALTH CARE EDUCATION/TRAINING PROGRAM

## 2024-02-24 PROCEDURE — 83735 ASSAY OF MAGNESIUM: CPT | Performed by: STUDENT IN AN ORGANIZED HEALTH CARE EDUCATION/TRAINING PROGRAM

## 2024-02-24 PROCEDURE — 80048 BASIC METABOLIC PNL TOTAL CA: CPT | Performed by: STUDENT IN AN ORGANIZED HEALTH CARE EDUCATION/TRAINING PROGRAM

## 2024-02-24 RX ORDER — MAGNESIUM SULFATE 1 G/100ML
1 INJECTION INTRAVENOUS ONCE
Status: COMPLETED | OUTPATIENT
Start: 2024-02-24 | End: 2024-02-25

## 2024-02-24 RX ORDER — FUROSEMIDE 10 MG/ML
20 INJECTION INTRAMUSCULAR; INTRAVENOUS ONCE
Qty: 2 ML | Refills: 0 | Status: COMPLETED | OUTPATIENT
Start: 2024-02-24 | End: 2024-02-24

## 2024-02-24 RX ADMIN — FUROSEMIDE 20 MG: 10 INJECTION, SOLUTION INTRAMUSCULAR; INTRAVENOUS at 10:17

## 2024-02-24 RX ADMIN — MAGNESIUM SULFATE HEPTAHYDRATE 1 G: 1 INJECTION, SOLUTION INTRAVENOUS at 10:38

## 2024-02-24 RX ADMIN — SODIUM CHLORIDE, SODIUM LACTATE, POTASSIUM CHLORIDE, AND CALCIUM CHLORIDE 100 ML/HR: .6; .31; .03; .02 INJECTION, SOLUTION INTRAVENOUS at 06:19

## 2024-02-24 NOTE — PROGRESS NOTES
NEPHROLOGY PROGRESS NOTE   Michael Chan 18 y.o. male MRN: [id number]  Unit/Bed#: [facility] 816-01 Encounter: [id number]  Reason for Consult: Rhabdomyolysis    ASSESSMENT AND PLAN:  17 yo with no significant past medical history p/w progressive worsening bilateral upper extremity edema.  Patient was found to have rhabdomyolysis.  Nephrology is consulted for management of ANIKET prevention     PLAN:     # Rhabdomyolysis with no ANIKET  CK on admission 60,000  CK fluctuates unclear reason.  Could be due to severe upper extremity edema and tenderness  Last CK 1600, trending down  Thought to be secondary to extreme exercise  management as per primary team  Upper extremity edema improved  Primary team discussed case with rheumatology they are not concerned of dermatomyositis or polymyositis at this time        # Prevention of ANIKET in the settings of rhabdomyolysis  Patient was started on IV fluids to prevent precipitation of myoglobin  IV fluids decreased initially to 250 mL/h yesterday, further decreased to 100 MLS per hour   Started on Lasix 20 mg to improve upper extremity edema   Plan to repeat Lasix 20 mg today  Monitor urinary output  Daily weight  No evidence of acute tubular injury at this time  IV fluids placed supportive care role in the settings of rhabdomyolysis.  It does not resolve the source of CK      The highlighted and/or bolded points in my assessment, plan, and disposition were discussed with the primary team and they agree with those points and the plan.  Previous records were personally reviewed by me to obtain a baseline creatinine.   The images (CXR) were personally reviewed by me in PACS      SUBJECTIVE:  Patient seen and examined at bedside. No chest pain, shortness of breath, nausea,       OBJECTIVE:  Current Weight: Weight - Scale: 78.9 kg (174 lb)  Vitals:    02/24/24 0839   BP: 107/62   Pulse: (!) 50   Resp:    Temp: 98.1 °F (36.7 °C)   SpO2: 98%       Intake/Output Summary (Last 24 hours) at  2/24/2024 0914  Last data filed at 2/24/2024 0625  Gross per 24 hour   Intake 1159.67 ml   Output 5575 ml   Net -4415.33 ml     Wt Readings from Last 3 Encounters:   02/21/24 78.9 kg (174 lb) (75%, Z= 0.67)*     * Growth percentiles are based on [facility] (Boys, 2-20 Years) data.     Temp Readings from Last 3 Encounters:   02/24/24 98.1 °F (36.7 °C)     BP Readings from Last 3 Encounters:   02/24/24 107/62     Pulse Readings from Last 3 Encounters:   02/24/24 (!) 50        General:  no acute distress at this time  Skin:  No acute rash  Eyes:  No scleral icterus and noninjected  ENT:  mucous membranes moist  Neck:  no carotid bruits  Chest:  Clear to auscultation percussion, good respiratory effort, no use of accessory respiratory muscles  CVS:  Regular rate and rhythm without rub   Abdomen:  soft and nontender   Extremities: upper extremity edema improved  Neuro:  No gross focality  Psych:  Alert , cooperative       Medications:    Current Facility-Administered Medications:     furosemide (LASIX) injection 20 mg, 20 mg, Intravenous, Once, Joselyn Reyes Bahamonde, MD    magnesium sulfate IVPB (premix) SOLN 1 g, 1 g, Intravenous, Once, Yann Katz DO    Laboratory Results:  Results from last 7 days   Lab Units 02/24/24  0543 02/23/24  0849 02/22/24  0528 02/21/24  0533 02/20/24  0420 02/19/24  0433 02/19/24  0207 02/18/24  2233   WBC Thousand/uL 7.18  --   --  7.49 8.68 10.07  --  14.45*   HEMOGLOBIN g/dL 13.2  --   --  11.7* 12.9 12.8  --  15.2   HEMATOCRIT % 36.5  --   --  32.7* 37.7 36.7  --  42.0   PLATELETS Thousands/uL 255  --   --  210 248 240  --  307   SODIUM mmol/L 137 138 140 139 138 138  --  138   POTASSIUM mmol/L 3.7 3.9 3.8 3.8 3.8 3.3*  --  3.7   CHLORIDE mmol/L 104 103 105 106 106 106  --  104   CO2 mmol/L 26 27 28 26 29 25  --  25   BUN mg/dL 15 9 12 9 7 13  --  21   CREATININE mg/dL 0.73 0.58* 0.60 0.56* 0.65 0.65  --  0.80   CALCIUM mg/dL 8.7 9.1 9.0 8.5 8.8 7.8*  --  8.8   MAGNESIUM mg/dL 1.7*   "--  1.7* 1.7* 1.6* 1.9  --   --    PHOSPHORUS mg/dL  --   --   --   --   --   --  3.2  --        XR chest portable   Final Result by Kris Crowder MD (02/20 6641)      No active pulmonary disease.            Workstation performed: [id number]         VAS upper limb venous duplex scan, complete, bilateral   Final Result by Chato Varner MD (02/19 5750)          Portions of the record may have been created with voice recognition software. Occasional wrong word or \"sound a like\" substitutions may have occurred due to the inherent limitations of voice recognition software. Read the chart carefully and recognize, using context, where substitutions have occurred.    "

## 2024-02-24 NOTE — PROGRESS NOTES
"[facility]  Progress Note  Name: Michael Chan I  MRN: [id number]  Unit/Bed#: [facility] 816-01 I Date of Admission: 2/18/2024   Date of Service: 2/24/2024 I Hospital Day: 5    Assessment/Plan   * Rhabdomyolysis  Assessment & Plan  Reported bilateral upper extremity \"tightness\" that began about 8 hours after he exercised at the [employer] gym this past Thursday; reported waking up the next morning and noticing that both of his arms were significantly swollen although not painful.  Reported that swelling worsened prompting him to come to the ER.  Patient reported that he did use weights including performing dumbbell bicep curls as well as bench press; reported that last time that he worked out similar to this was in November.  Denied that the workout was far in excess of what he has done in the past and reported that he was at the gym about 45 minutes  CK significantly elevated: 60,550, initially improved to 36,550, at which point IVF rate was decreased, however CK then elevated to 73,400 and IVF rate was increased again. Subsequent CKs have improved: 61,739 --> 30,152 and Nephrology recommended to decrease LR to 250cc/hr. Subsequent CK worsening to 64,270 which is now again downtrending to 31,212 and nephrology recommend increasing LR to 300 cc/h.  Appreciate ongoing Nephrology recs  Discussed with rheumatology on 2/22 who reports that patient's symptoms do not align with dermatomyositis or polymyositis given acute onset nature, especially given downtrending CKs without any immunosuppressive agents.   Discussed with neurology on 2/22 who also reports that patient's symptoms do not align with neuromuscular myopathies and recommend continuing IV hydration and trending CK.  If no significant improvement, could consider possible EMG.   General surgery evaluated patient again on 2/22 who reported no evidence of compartment syndrome.  Creatinine remains within normal " limits  Noted mild leukocytosis of 14.0 on admission, resolved, no clear infectious etiology identified and being monitored off antibiotics at this time   Bilateral venous ultrasounds of the upper extremities was Negative for DVT per the results report  2/24: CK trending down to 16,000 today.  Will repeat CK in the morning without tourniquet.  Will discontinue IVF today.  IV Lasix given to help with edema by nephrology.  Encourage oral hydration.  If CK levels trending down, will hold off on further inpatient management. If no significant improvement over the next 24-48 hours will consider discussion again with rheumatology and neurology for further evaluation.    Hypomagnesemia  Assessment & Plan  Repleted, continue to monitor    Hypokalemia  Assessment & Plan  Resolved with repletion, monitor       Swelling of arm  Assessment & Plan  See rhabdo section above  Lasix today to help with swelling             VTE Pharmacologic Prophylaxis: VTE Score: 2 Low Risk (Score 0-2) - Encourage Ambulation.    Mobility:   Basic Mobility Inpatient Raw Score: 22  JH-HLM Goal: 7: Walk 25 feet or more  JH-HLM Achieved: 7: Walk 25 feet or more  HLM Goal achieved. Continue to encourage appropriate mobility.    Patient Centered Rounds: I performed bedside rounds with nursing staff today.   Discussions with Specialists or Other Care Team Provider: Nephrology    Education and Discussions with Family / Patient: Updated  (father and mother) via phone.    Total Time Spent on Date of Encounter in care of patient: 25 mins. This time was spent on one or more of the following: performing physical exam; counseling and coordination of care; obtaining or reviewing history; documenting in the medical record; reviewing/ordering tests, medications or procedures; communicating with other healthcare professionals and discussing with patient's family/caregivers.    Current Length of Stay: 5 day(s)  Current Patient Status: Inpatient    Certification Statement: The patient will continue to require additional inpatient hospital stay due to rhabdomyolysis requiring closer monitoring, IV diuretics today  Discharge Plan: Anticipate discharge tomorrow to home.    Code Status: Level 1 - Full Code    Subjective:   Patient assessed at bedside.  Reports swelling in the extremities is improved.  No other complaints.    Objective:     Vitals:   Temp (24hrs), Av.1 °F (36.7 °C), Min:98.1 °F (36.7 °C), Max:98.1 °F (36.7 °C)    Temp:  [98.1 °F (36.7 °C)] 98.1 °F (36.7 °C)  HR:  [50-78] 55  BP: (107-128)/(62-72) 119/67  SpO2:  [97 %-98 %] 98 %  Body mass index is 25.33 kg/m².     Input and Output Summary (last 24 hours):     Intake/Output Summary (Last 24 hours) at 2024 1532  Last data filed at 2024 1259  Gross per 24 hour   Intake 480 ml   Output 3575 ml   Net -3095 ml       Physical Exam:   Physical Exam  Vitals reviewed.   Constitutional:       General: He is not in acute distress.     Appearance: Normal appearance. He is not ill-appearing.   HENT:      Head: Normocephalic and atraumatic.   Eyes:      General: No scleral icterus.     Conjunctiva/sclera: Conjunctivae normal.   Cardiovascular:      Rate and Rhythm: Normal rate and regular rhythm.      Heart sounds: Normal heart sounds.   Pulmonary:      Effort: Pulmonary effort is normal.      Breath sounds: Normal breath sounds.   Abdominal:      General: Bowel sounds are normal.      Tenderness: There is no abdominal tenderness.   Musculoskeletal:         General: Swelling (Upper extremity swelling improved) present.      Right lower leg: No edema.      Left lower leg: No edema.   Skin:     General: Skin is warm and dry.   Neurological:      Mental Status: He is alert and oriented to person, place, and time. Mental status is at baseline.   Psychiatric:         Mood and Affect: Mood normal.         Behavior: Behavior normal.          Additional Data:     Labs:  Results from last 7 days   Lab  Units 02/24/24  0543   WBC Thousand/uL 7.18   HEMOGLOBIN g/dL 13.2   HEMATOCRIT % 36.5   PLATELETS Thousands/uL 255   NEUTROS PCT % 69   LYMPHS PCT % 23   MONOS PCT % 6   EOS PCT % 1     Results from last 7 days   Lab Units 02/24/24  0543 02/22/24  0528 02/21/24  0533   SODIUM mmol/L 137   < > 139   POTASSIUM mmol/L 3.7   < > 3.8   CHLORIDE mmol/L 104   < > 106   CO2 mmol/L 26   < > 26   BUN mg/dL 15   < > 9   CREATININE mg/dL 0.73   < > 0.56*   ANION GAP mmol/L 7   < > 7   CALCIUM mg/dL 8.7   < > 8.5   ALBUMIN g/dL  --   --  3.2*   TOTAL BILIRUBIN mg/dL  --   --  0.28   ALK PHOS U/L  --   --  41   ALT U/L  --   --  182*   AST U/L  --   --  559*   GLUCOSE RANDOM mg/dL 87   < > 121    < > = values in this interval not displayed.     Results from last 7 days   Lab Units 02/19/24  0433   INR  1.17     Results from last 7 days   Lab Units 02/21/24  0733   POC GLUCOSE mg/dl 101               Lines/Drains:  Invasive Devices       Peripheral Intravenous Line  Duration             Peripheral IV 02/23/24 Dorsal (posterior);Left Forearm 1 day                          Imaging: No pertinent imaging reviewed.    Recent Cultures (last 7 days):   Results from last 7 days   Lab Units 02/19/24  0211 02/19/24  0207   BLOOD CULTURE  No Growth After 5 Days. No Growth After 5 Days.       Last 24 Hours Medication List:        Today, Patient Was Seen By: Yann Katz DO    **Please Note: This note may have been constructed using a voice recognition system.**

## 2024-02-24 NOTE — PLAN OF CARE
Problem: PAIN - ADULT  Goal: Verbalizes/displays adequate comfort level or baseline comfort level  Description: Interventions:  - Encourage patient to monitor pain and request assistance  - Assess pain using appropriate pain scale  - Administer analgesics based on type and severity of pain and evaluate response  - Implement non-pharmacological measures as appropriate and evaluate response  - Consider cultural and social influences on pain and pain management  - Notify physician/advanced practitioner if interventions unsuccessful or patient reports new pain  Outcome: Progressing     Problem: INFECTION - ADULT  Goal: Absence or prevention of progression during hospitalization  Description: INTERVENTIONS:  - Assess and monitor for signs and symptoms of infection  - Monitor lab/diagnostic results  - Monitor all insertion sites, i.e. indwelling lines, tubes, and drains  - Monitor endotracheal if appropriate and nasal secretions for changes in amount and color  - [place] appropriate cooling/warming therapies per order  - Administer medications as ordered  - Instruct and encourage patient and family to use good hand hygiene technique  - Identify and instruct in appropriate isolation precautions for identified infection/condition  Outcome: Progressing  Goal: Absence of fever/infection during neutropenic period  Description: INTERVENTIONS:  - Monitor WBC    Outcome: Progressing     Problem: SAFETY ADULT  Goal: Patient will remain free of falls  Description: INTERVENTIONS:  - Educate patient/family on patient safety including physical limitations  - Instruct patient to call for assistance with activity   - Consult OT/PT to assist with strengthening/mobility   - Keep Call bell within reach  - Keep bed low and locked with side rails adjusted as appropriate  - Keep care items and personal belongings within reach  - Initiate and maintain comfort rounds  - Make Fall Risk Sign visible to staff  - Offer Toileting every ***  Hours, in advance of need  - Initiate/Maintain ***alarm  - Obtain necessary fall risk management equipment: ***  - Apply yellow socks and bracelet for high fall risk patients  - Consider moving patient to room near nurses station  Outcome: Progressing  Goal: Maintain or return to baseline ADL function  Description: INTERVENTIONS:  -  Assess patient's ability to carry out ADLs; assess patient's baseline for ADL function and identify physical deficits which impact ability to perform ADLs (bathing, care of mouth/teeth, toileting, grooming, dressing, etc.)  - Assess/evaluate cause of self-care deficits   - Assess range of motion  - Assess patient's mobility; develop plan if impaired  - Assess patient's need for assistive devices and provide as appropriate  - Encourage maximum independence but intervene and supervise when necessary  - Involve family in performance of ADLs  - Assess for home care needs following discharge   - Consider OT consult to assist with ADL evaluation and planning for discharge  - Provide patient education as appropriate  Outcome: Progressing  Goal: Maintains/Returns to pre admission functional level  Description: INTERVENTIONS:  - Perform AM-PAC 6 Click Basic Mobility/ Daily Activity assessment daily.  - Set and communicate daily mobility goal to care team and patient/family/caregiver.   - Collaborate with rehabilitation services on mobility goals if consulted  - Dangle patient 3 times a day  - Out of bed for meals 3 times a day  - Out of bed for toileting  - Record patient progress and toleration of activity level   Outcome: Progressing     Problem: DISCHARGE PLANNING  Goal: Discharge to home or other facility with appropriate resources  Description: INTERVENTIONS:  - Identify barriers to discharge w/patient and caregiver  - Arrange for needed discharge resources and transportation as appropriate  - Identify discharge learning needs (meds, wound care, etc.)  - Arrange for interpretive services to  assist at discharge as needed  - Refer to Case Management Department for coordinating discharge planning if the patient needs post-hospital services based on physician/advanced practitioner order or complex needs related to functional status, cognitive ability, or social support system  Outcome: Progressing     Problem: Knowledge Deficit  Goal: Patient/family/caregiver demonstrates understanding of disease process, treatment plan, medications, and discharge instructions  Description: Complete learning assessment and assess knowledge base.  Interventions:  - Provide teaching at level of understanding  - Provide teaching via preferred learning methods  Outcome: Progressing

## 2024-02-25 VITALS
DIASTOLIC BLOOD PRESSURE: 56 MMHG | OXYGEN SATURATION: 98 % | HEART RATE: 53 BPM | WEIGHT: 174 LBS | RESPIRATION RATE: 18 BRPM | BODY MASS INDEX: 24.91 KG/M2 | TEMPERATURE: 97.9 F | HEIGHT: 70 IN | SYSTOLIC BLOOD PRESSURE: 127 MMHG

## 2024-02-25 LAB
ANION GAP SERPL CALCULATED.3IONS-SCNC: 7 MMOL/L
BUN SERPL-MCNC: 16 MG/DL (ref 5–25)
CALCIUM SERPL-MCNC: 9.1 MG/DL (ref 8.4–10.2)
CHLORIDE SERPL-SCNC: 105 MMOL/L (ref 96–108)
CK SERPL-CCNC: 9756 U/L (ref 39–308)
CO2 SERPL-SCNC: 25 MMOL/L (ref 21–32)
CREAT SERPL-MCNC: 0.65 MG/DL (ref 0.6–1.3)
GFR SERPL CREATININE-BSD FRML MDRD: 141 ML/MIN/1.73SQ M
GLUCOSE SERPL-MCNC: 94 MG/DL (ref 65–140)
POTASSIUM SERPL-SCNC: 3.9 MMOL/L (ref 3.5–5.3)
SODIUM SERPL-SCNC: 137 MMOL/L (ref 135–147)

## 2024-02-25 PROCEDURE — 80048 BASIC METABOLIC PNL TOTAL CA: CPT | Performed by: STUDENT IN AN ORGANIZED HEALTH CARE EDUCATION/TRAINING PROGRAM

## 2024-02-25 PROCEDURE — 82550 ASSAY OF CK (CPK): CPT | Performed by: STUDENT IN AN ORGANIZED HEALTH CARE EDUCATION/TRAINING PROGRAM

## 2024-02-25 PROCEDURE — 99232 SBSQ HOSP IP/OBS MODERATE 35: CPT | Performed by: STUDENT IN AN ORGANIZED HEALTH CARE EDUCATION/TRAINING PROGRAM

## 2024-02-25 PROCEDURE — 99239 HOSP IP/OBS DSCHRG MGMT >30: CPT | Performed by: INTERNAL MEDICINE

## 2024-02-25 RX ORDER — FUROSEMIDE 20 MG/1
10 TABLET ORAL DAILY
Qty: 1 TABLET | Refills: 0 | Status: SHIPPED | OUTPATIENT
Start: 2024-02-26 | End: 2024-02-28

## 2024-02-25 RX ORDER — FUROSEMIDE 20 MG/1
10 TABLET ORAL DAILY
Status: DISCONTINUED | OUTPATIENT
Start: 2024-02-25 | End: 2024-02-25 | Stop reason: HOSPADM

## 2024-02-25 RX ADMIN — FUROSEMIDE 10 MG: 20 TABLET ORAL at 09:19

## 2024-02-25 NOTE — PLAN OF CARE
Problem: PAIN - ADULT  Goal: Verbalizes/displays adequate comfort level or baseline comfort level  Description: Interventions:  - Encourage patient to monitor pain and request assistance  - Assess pain using appropriate pain scale  - Administer analgesics based on type and severity of pain and evaluate response  - Implement non-pharmacological measures as appropriate and evaluate response  - Consider cultural and social influences on pain and pain management  - Notify physician/advanced practitioner if interventions unsuccessful or patient reports new pain  Outcome: Progressing     Problem: INFECTION - ADULT  Goal: Absence or prevention of progression during hospitalization  Description: INTERVENTIONS:  - Assess and monitor for signs and symptoms of infection  - Monitor lab/diagnostic results  - Monitor all insertion sites, i.e. indwelling lines, tubes, and drains  - Monitor endotracheal if appropriate and nasal secretions for changes in amount and color  - [place] appropriate cooling/warming therapies per order  - Administer medications as ordered  - Instruct and encourage patient and family to use good hand hygiene technique  - Identify and instruct in appropriate isolation precautions for identified infection/condition  Outcome: Progressing  Goal: Absence of fever/infection during neutropenic period  Description: INTERVENTIONS:  - Monitor WBC    Outcome: Progressing     Problem: SAFETY ADULT  Goal: Patient will remain free of falls  Description: INTERVENTIONS:  - Educate patient/family on patient safety including physical limitations  - Instruct patient to call for assistance with activity   - Consult OT/PT to assist with strengthening/mobility   - Keep Call bell within reach  - Keep bed low and locked with side rails adjusted as appropriate  - Keep care items and personal belongings within reach  - Initiate and maintain comfort rounds  - Make Fall Risk Sign visible to staff  - Initiate/Maintain alarm  -  Apply yellow socks and bracelet for high fall risk patients  - Consider moving patient to room near nurses station  Outcome: Progressing  Goal: Maintain or return to baseline ADL function  Description: INTERVENTIONS:  -  Assess patient's ability to carry out ADLs; assess patient's baseline for ADL function and identify physical deficits which impact ability to perform ADLs (bathing, care of mouth/teeth, toileting, grooming, dressing, etc.)  - Assess/evaluate cause of self-care deficits   - Assess range of motion  - Assess patient's mobility; develop plan if impaired  - Assess patient's need for assistive devices and provide as appropriate  - Encourage maximum independence but intervene and supervise when necessary  - Involve family in performance of ADLs  - Assess for home care needs following discharge   - Consider OT consult to assist with ADL evaluation and planning for discharge  - Provide patient education as appropriate  Outcome: Progressing  Goal: Maintains/Returns to pre admission functional level  Description: INTERVENTIONS:  - Perform AM-PAC 6 Click Basic Mobility/ Daily Activity assessment daily.  - Set and communicate daily mobility goal to care team and patient/family/caregiver.   - Collaborate with rehabilitation services on mobility goals if consulted  Outcome: Progressing     Problem: DISCHARGE PLANNING  Goal: Discharge to home or other facility with appropriate resources  Description: INTERVENTIONS:  - Identify barriers to discharge w/patient and caregiver  - Arrange for needed discharge resources and transportation as appropriate  - Identify discharge learning needs (meds, wound care, etc.)  - Arrange for interpretive services to assist at discharge as needed  - Refer to Case Management Department for coordinating discharge planning if the patient needs post-hospital services based on physician/advanced practitioner order or complex needs related to functional status, cognitive ability, or social  support system  Outcome: Progressing     Problem: Knowledge Deficit  Goal: Patient/family/caregiver demonstrates understanding of disease process, treatment plan, medications, and discharge instructions  Description: Complete learning assessment and assess knowledge base.  Interventions:  - Provide teaching at level of understanding  - Provide teaching via preferred learning methods  Outcome: Progressing

## 2024-02-25 NOTE — PLAN OF CARE
Problem: PAIN - ADULT  Goal: Verbalizes/displays adequate comfort level or baseline comfort level  Description: Interventions:  - Encourage patient to monitor pain and request assistance  - Assess pain using appropriate pain scale  - Administer analgesics based on type and severity of pain and evaluate response  - Implement non-pharmacological measures as appropriate and evaluate response  - Consider cultural and social influences on pain and pain management  - Notify physician/advanced practitioner if interventions unsuccessful or patient reports new pain  Outcome: Progressing     Problem: INFECTION - ADULT  Goal: Absence or prevention of progression during hospitalization  Description: INTERVENTIONS:  - Assess and monitor for signs and symptoms of infection  - Monitor lab/diagnostic results  - Monitor all insertion sites, i.e. indwelling lines, tubes, and drains  - Monitor endotracheal if appropriate and nasal secretions for changes in amount and color  - [place] appropriate cooling/warming therapies per order  - Administer medications as ordered  - Instruct and encourage patient and family to use good hand hygiene technique  - Identify and instruct in appropriate isolation precautions for identified infection/condition  Outcome: Progressing

## 2024-02-25 NOTE — PROGRESS NOTES
NEPHROLOGY PROGRESS NOTE   Michael Chan 18 y.o. male MRN: [id number]  Unit/Bed#: [facility] 816-01 Encounter: [id number]  Reason for Consult: Rhabdomyolysis    ASSESSMENT AND PLAN:  19 yo with no significant past medical history p/w progressive worsening bilateral upper extremity edema.  Patient was found to have rhabdomyolysis.  Nephrology is consulted for management of ANIKET prevention     PLAN:     # Rhabdomyolysis with no ANIKET  CK on admission 60,000  CK trending down, last CK 9756   CK improved with IV diuresis and improvement of upper extremity tenderness.  Patient probably hadcomponent of mild compression syndrome   Etiology of rhabdo thought to be secondary to extreme exercise  IV fluids discontinued on 2/24   primary team discussed case with rheumatology they are not concerned of dermatomyositis or polymyositis at this time        # Prevention of ANIKET in the settings of rhabdomyolysis  Patient was started on IV fluids to prevent precipitation of myoglobin  IV fluids decreased initially to 250 mL/h yesterday, further decreased to 100 MLS per hour   IV fluids discontinued   received Lasix 20 mg IV x 2  Recommend continue with furosemide 10 mg for 3 days to improve upper extremity edema and then discontinued  No evidence of acute tubular injury at this time  IV fluids placed supportive care role in the settings of rhabdomyolysis.  It does not resolve the source of CK  Patient can follow-up with PCP on discharge    The highlighted and/or bolded points in my assessment, plan, and disposition were discussed with the primary team and they agree with those points and the plan.  Previous records were personally reviewed by me to obtain a baseline creatinine.   The images (CXR) were personally reviewed by me in PACS      SUBJECTIVE:  Patient seen and examined at bedside. No chest pain, shortness of breath, nausea, vomiting, abdominal pain or diarrhea.       OBJECTIVE:  Current Weight: Weight - Scale: 78.9 kg (174  lb)  Vitals:    02/25/24 0739   BP: 127/56   Pulse: (!) 53   Resp:    Temp: 97.9 °F (36.6 °C)   SpO2: 98%       Intake/Output Summary (Last 24 hours) at 2/25/2024 0820  Last data filed at 2/24/2024 2102  Gross per 24 hour   Intake 480 ml   Output 1175 ml   Net -695 ml     Wt Readings from Last 3 Encounters:   02/21/24 78.9 kg (174 lb) (75%, Z= 0.67)*     * Growth percentiles are based on CDC (Boys, 2-20 Years) data.     Temp Readings from Last 3 Encounters:   02/25/24 97.9 °F (36.6 °C)     BP Readings from Last 3 Encounters:   02/25/24 127/56     Pulse Readings from Last 3 Encounters:   02/25/24 (!) 53        General:  no acute distress at this time  Skin:  No acute rash  Eyes:  No scleral icterus and noninjected  ENT:  mucous membranes moist  Neck:  no carotid bruits  Chest:  Clear to auscultation percussion, good respiratory effort, no use of accessory respiratory muscles  CVS:  Regular rate and rhythm without rub   Abdomen:  soft and nontender   Extremities: Upper extremity edema improving  Neuro:  No gross focality  Psych:  Alert , cooperative       Medications:  No current facility-administered medications for this encounter.    Laboratory Results:  Results from last 7 days   Lab Units 02/25/24  0527 02/24/24  0543 02/23/24  0849 02/22/24  0528 02/21/24  0533 02/20/24  0420 02/19/24  0433 02/19/24  0207 02/18/24  2233   WBC Thousand/uL  --  7.18  --   --  7.49 8.68 10.07  --  14.45*   HEMOGLOBIN g/dL  --  13.2  --   --  11.7* 12.9 12.8  --  15.2   HEMATOCRIT %  --  36.5  --   --  32.7* 37.7 36.7  --  42.0   PLATELETS Thousands/uL  --  255  --   --  210 248 240  --  307   SODIUM mmol/L 137 137 138 140 139 138 138  --  138   POTASSIUM mmol/L 3.9 3.7 3.9 3.8 3.8 3.8 3.3*  --  3.7   CHLORIDE mmol/L 105 104 103 105 106 106 106  --  104   CO2 mmol/L 25 26 27 28 26 29 25  --  25   BUN mg/dL 16 15 9 12 9 7 13  --  21   CREATININE mg/dL 0.65 0.73 0.58* 0.60 0.56* 0.65 0.65  --  0.80   CALCIUM mg/dL 9.1 8.7 9.1 9.0 8.5  "8.8 7.8*  --  8.8   MAGNESIUM mg/dL  --  1.7*  --  1.7* 1.7* 1.6* 1.9  --   --    PHOSPHORUS mg/dL  --   --   --   --   --   --   --  3.2  --        XR chest portable   Final Result by Kris Crowder MD (02/20 0372)      No active pulmonary disease.            Workstation performed: [id number]         VAS upper limb venous duplex scan, complete, bilateral   Final Result by Chato Varner MD (02/19 1310)          Portions of the record may have been created with voice recognition software. Occasional wrong word or \"sound a like\" substitutions may have occurred due to the inherent limitations of voice recognition software. Read the chart carefully and recognize, using context, where substitutions have occurred.    "

## 2024-02-26 NOTE — DISCHARGE SUMMARY
"[facility]  Discharge- Michael Chan 2005, 18 y.o. male MRN: [id number]  Unit/Bed#: [facility] 816-01 Encounter: [id number]  Primary Care Provider: No primary care provider on file.   Date and time admitted to hospital: 2/18/2024 10:15 PM    Hypomagnesemia  Assessment & Plan  Repleted, continue to monitor    Hypokalemia  Assessment & Plan  Resolved with repletion, monitor       Swelling of arm  Assessment & Plan  See rhabdo section above  Sp lasix as per nephro    * Rhabdomyolysis  Assessment & Plan  Reported bilateral upper extremity \"tightness\" that began about 8 hours after he exercised at the [employer] gym this past Thursday; reported waking up the next morning and noticing that both of his arms were significantly swollen although not painful.  Reported that swelling worsened prompting him to come to the ER.  Patient reported that he did use weights including performing dumbbell bicep curls as well as bench press; reported that last time that he worked out similar to this was in November.  Denied that the workout was far in excess of what he has done in the past and reported that he was at the gym about 45 minutes  CK significantly elevated: 60,550, initially improved to 36,550, at which point IVF rate was decreased, however CK then elevated to 73,400 and IVF rate was increased again. Subsequent CKs have improved: 61,739 --> 30,152 and Nephrology recommended to decrease LR to 250cc/hr. Subsequent CK worsening to 64,270 which is now again downtrending to 31,212 and nephrology recommend increasing LR to 300 cc/h.  Appreciate ongoing Nephrology recs  Discussed with rheumatology on 2/22 who reports that patient's symptoms do not align with dermatomyositis or polymyositis given acute onset nature, especially given downtrending CKs without any immunosuppressive agents.   Discussed with neurology on 2/22 who also reports that patient's symptoms do not align with " neuromuscular myopathies and recommend continuing IV hydration and trending CK.  If no significant improvement, could consider possible EMG.   General surgery evaluated patient again on 2/22 who reported no evidence of compartment syndrome.  Creatinine remains within normal limits  Noted mild leukocytosis of 14.0 on admission, resolved, no clear infectious etiology identified and being monitored off antibiotics at this time   Bilateral venous ultrasounds of the upper extremities was Negative for DVT per the results report  2/24: CK trending down to 16,000 today.  Will repeat CK in the morning without tourniquet.  Will discontinue IVF today.  IV Lasix given to help with edema by nephrology.  Encourage oral hydration.  If CK levels trending down, will hold off on further inpatient management.   2/25: ck continues to trend down. Discussed monitoring another 24 hours with patient, prefers for discharge with outpatient repeat lab work.          Medical Problems       Resolved Problems  Date Reviewed: 2/24/2024   None       Discharging Physician / Practitioner: Anthony Valentine DO  PCP: No primary care provider on file.  Admission Date:   Admission Orders (From admission, onward)       Ordered        02/19/24 0048  INPATIENT ADMISSION  Once                          Discharge Date: 02/25/24    Consultations During Hospital Stay:  Nephro  surgery    Procedures Performed:       Significant Findings / Test Results:   XR chest portable   Final Result by Kris Crowder MD (02/20 0833)      No active pulmonary disease.            Workstation performed: [id number]         VAS upper limb venous duplex scan, complete, bilateral   Final Result by Chato Varner MD (02/19 1310)           Incidental Findings:    As under imaging if noted  I reviewed the above mentioned incidental findings with the patient and/or family and they expressed understanding.    Test Results Pending at Discharge (will require follow up):        Outpatient  "Tests Requested:      Complications:      Reason for Admission: arm swelling    Hospital Course:   Michael Chan is a 18 y.o. male patient who originally presented to the hospital on 2/18/2024 due to arm swelling after working out, found to have rhabdomyolysis. He was monitored by general surgery without development of compartment syndrome. He improved with iv  fluids and was discharged with repeat lab work outpatient.        Please see above list of diagnoses and related plan for additional information.     Condition at Discharge: stable    Discharge Day Visit / Exam:   Subjective:  patient denies any acute complaints  Vitals: Blood Pressure: 127/56 (02/25/24 0739)  Pulse: (!) 53 (02/25/24 0739)  Temperature: 97.9 °F (36.6 °C) (02/25/24 0739)  Temp Source: Oral (02/22/24 0713)  Respirations: 18 (02/24/24 2306)  Height: 5' 9.5\" (176.5 cm) (02/21/24 1839)  Weight - Scale: 78.9 kg (174 lb) (02/21/24 1839)  SpO2: 98 % (02/25/24 0739)  Exam:   Physical Exam  Vitals and nursing note reviewed.   Constitutional:       General: He is not in acute distress.     Appearance: He is well-developed. He is not toxic-appearing or diaphoretic.   HENT:      Head: Normocephalic and atraumatic.   Eyes:      General: No scleral icterus.     Conjunctiva/sclera: Conjunctivae normal.   Cardiovascular:      Rate and Rhythm: Normal rate and regular rhythm.      Heart sounds: No murmur heard.     No friction rub. No gallop.   Pulmonary:      Effort: Pulmonary effort is normal. No respiratory distress.      Breath sounds: Normal breath sounds. No stridor. No wheezing, rhonchi or rales.   Chest:      Chest wall: No tenderness.   Abdominal:      General: There is no distension.      Palpations: Abdomen is soft. There is no mass.      Tenderness: There is no abdominal tenderness. There is no guarding or rebound.      Hernia: No hernia is present.   Musculoskeletal:         General: Swelling present.      Cervical back: Neck supple.   Skin:     " General: Skin is warm and dry.      Capillary Refill: Capillary refill takes less than 2 seconds.   Neurological:      Mental Status: He is alert and oriented to person, place, and time.   Psychiatric:         Mood and Affect: Mood normal.          Discussion with Family: Updated  (father) via phone.    Discharge instructions/Information to patient and family:   See after visit summary for information provided to patient and family.      Provisions for Follow-Up Care:  See after visit summary for information related to follow-up care and any pertinent home health orders.      Mobility at time of Discharge:   Basic Mobility Inpatient Raw Score: 24  JH-HLM Goal: 8: Walk 250 feet or more  JH-HLM Achieved: 8: Walk 250 feet ot more  HLM Goal achieved. Continue to encourage appropriate mobility.     Disposition:   Home    Planned Readmission: no     Discharge Statement:  I spent  minutes discharging the patient. This time was spent on the day of discharge. I had direct contact with the patient on the day of discharge. Greater than 50% of the total time was spent examining patient, answering all patient questions, arranging and discussing plan of care with patient as well as directly providing post-discharge instructions.  Additional time then spent on discharge activities.    Discharge Medications:  See after visit summary for reconciled discharge medications provided to patient and/or family.      **Please Note: This note may have been constructed using a voice recognition system**

## 2024-02-26 NOTE — ASSESSMENT & PLAN NOTE
"Reported bilateral upper extremity \"tightness\" that began about 8 hours after he exercised at the [email or web address] this past Thursday; reported waking up the next morning and noticing that both of his arms were significantly swollen although not painful.  Reported that swelling worsened prompting him to come to the ER.  Patient reported that he did use weights including performing dumbbell bicep curls as well as bench press; reported that last time that he worked out similar to this was in November.  Denied that the workout was far in excess of what he has done in the past and reported that he was at the gym about 45 minutes  CK significantly elevated: 60,550, initially improved to 36,550, at which point IVF rate was decreased, however CK then elevated to 73,400 and IVF rate was increased again. Subsequent CKs have improved: 61,739 --> 30,152 and Nephrology recommended to decrease LR to 250cc/hr. Subsequent CK worsening to 64,270 which is now again downtrending to 31,212 and nephrology recommend increasing LR to 300 cc/h.  Appreciate ongoing Nephrology recs  Discussed with rheumatology on 2/22 who reports that patient's symptoms do not align with dermatomyositis or polymyositis given acute onset nature, especially given downtrending CKs without any immunosuppressive agents.   Discussed with neurology on 2/22 who also reports that patient's symptoms do not align with neuromuscular myopathies and recommend continuing IV hydration and trending CK.  If no significant improvement, could consider possible EMG.   General surgery evaluated patient again on 2/22 who reported no evidence of compartment syndrome.  Creatinine remains within normal limits  Noted mild leukocytosis of 14.0 on admission, resolved, no clear infectious etiology identified and being monitored off antibiotics at this time   Bilateral venous ultrasounds of the upper extremities was Negative for DVT per the results report  2/24: CK trending down to " 16,000 today.  Will repeat CK in the morning without tourniquet.  Will discontinue IVF today.  IV Lasix given to help with edema by nephrology.  Encourage oral hydration.  If CK levels trending down, will hold off on further inpatient management.   2/25: ck continues to trend down. Discussed monitoring another 24 hours with patient, prefers for discharge with outpatient repeat lab work.

## 2024-12-07 ENCOUNTER — APPOINTMENT (OUTPATIENT)
Dept: LAB | Facility: HOSPITAL | Age: 19
End: 2024-12-07
Attending: HOSPITALIST
Payer: COMMERCIAL

## 2024-12-07 ENCOUNTER — TRANSCRIBE ORDERS (OUTPATIENT)
Dept: REGISTRATION | Facility: HOSPITAL | Age: 19
End: 2024-12-07

## 2024-12-07 ENCOUNTER — HOSPITAL ENCOUNTER (OUTPATIENT)
Dept: RADIOLOGY | Facility: HOSPITAL | Age: 19
Discharge: HOME | End: 2024-12-07
Attending: HOSPITALIST
Payer: COMMERCIAL

## 2024-12-07 DIAGNOSIS — R50.9 FEVER, UNSPECIFIED: ICD-10-CM

## 2024-12-07 DIAGNOSIS — R68.83 CHILLS (WITHOUT FEVER): ICD-10-CM

## 2024-12-07 DIAGNOSIS — R53.83 OTHER FATIGUE: ICD-10-CM

## 2024-12-07 DIAGNOSIS — R68.83 CHILLS (WITHOUT FEVER): Primary | ICD-10-CM

## 2024-12-07 LAB
ALBUMIN SERPL-MCNC: 4.6 G/DL (ref 3.5–5.7)
ALP SERPL-CCNC: 86 IU/L (ref 34–125)
ALT SERPL-CCNC: 28 IU/L (ref 7–52)
ANION GAP SERPL CALC-SCNC: 9 MEQ/L (ref 3–15)
AST SERPL-CCNC: 19 IU/L (ref 13–39)
BACTERIA URNS QL MICRO: ABNORMAL /HPF
BASOPHILS # BLD: 0.08 K/UL (ref 0.01–0.1)
BASOPHILS NFR BLD: 1.2 %
BILIRUB SERPL-MCNC: 1 MG/DL (ref 0.3–1.2)
BILIRUB UR QL STRIP.AUTO: NEGATIVE MG/DL
BUN SERPL-MCNC: 21 MG/DL (ref 7–25)
CALCIUM SERPL-MCNC: 9.1 MG/DL (ref 8.6–10.3)
CHLORIDE SERPL-SCNC: 100 MEQ/L (ref 98–107)
CLARITY UR REFRACT.AUTO: CLEAR
CO2 SERPL-SCNC: 23 MEQ/L (ref 21–31)
COLOR UR AUTO: YELLOW
CREAT SERPL-MCNC: 1 MG/DL (ref 0.7–1.3)
CRP SERPL-MCNC: 1.4 MG/L
DIFFERENTIAL METHOD BLD: ABNORMAL
EGFRCR SERPLBLD CKD-EPI 2021: >60 ML/MIN/1.73M*2
EOSINOPHIL # BLD: 0.01 K/UL (ref 0.04–0.54)
EOSINOPHIL NFR BLD: 0.2 %
ERYTHROCYTE [DISTWIDTH] IN BLOOD BY AUTOMATED COUNT: 12.9 % (ref 11.6–14.4)
ERYTHROCYTE [SEDIMENTATION RATE] IN BLOOD BY WESTERGREN METHOD: 35 MM/HR
GLUCOSE SERPL-MCNC: 87 MG/DL (ref 70–99)
GLUCOSE UR STRIP.AUTO-MCNC: NEGATIVE MG/DL
HCT VFR BLD AUTO: 33.6 % (ref 40.1–51)
HGB BLD-MCNC: 11.5 G/DL (ref 13.7–17.5)
HGB UR QL STRIP.AUTO: NEGATIVE
HYALINE CASTS #/AREA URNS LPF: ABNORMAL /LPF
IMM GRANULOCYTES # BLD AUTO: 0.02 K/UL (ref 0–0.08)
IMM GRANULOCYTES NFR BLD AUTO: 0.3 %
KETONES UR STRIP.AUTO-MCNC: 2 MG/DL
LEUKOCYTE ESTERASE UR QL STRIP.AUTO: NEGATIVE
LYMPHOCYTES # BLD: 2.06 K/UL (ref 1.2–3.5)
LYMPHOCYTES NFR BLD: 31 %
MCH RBC QN AUTO: 30.6 PG (ref 28–33.2)
MCHC RBC AUTO-ENTMCNC: 34.2 G/DL (ref 32.2–36.5)
MCV RBC AUTO: 89.4 FL (ref 83–98)
MONOCYTES # BLD: 0.63 K/UL (ref 0.3–1)
MONOCYTES NFR BLD: 9.5 %
MUCOUS THREADS URNS QL MICRO: 2 /LPF
NEUTROPHILS # BLD: 3.85 K/UL (ref 1.7–7)
NEUTS SEG NFR BLD: 57.8 %
NITRITE UR QL STRIP.AUTO: NEGATIVE
NRBC BLD-RTO: 0 %
PH UR STRIP.AUTO: 6 [PH]
PLATELET # BLD AUTO: 255 K/UL (ref 150–350)
PMV BLD AUTO: 11.6 FL (ref 9.4–12.4)
POTASSIUM SERPL-SCNC: 4.7 MEQ/L (ref 3.5–5.1)
PROT SERPL-MCNC: 8.1 G/DL (ref 6–8.2)
PROT UR QL STRIP.AUTO: 1
RBC # BLD AUTO: 3.76 M/UL (ref 4.5–5.8)
RBC #/AREA URNS HPF: ABNORMAL /HPF
SODIUM SERPL-SCNC: 132 MEQ/L (ref 136–145)
SP GR UR REFRACT.AUTO: 1.03
SQUAMOUS URNS QL MICRO: ABNORMAL /HPF
UROBILINOGEN UR STRIP-ACNC: 0.2 EU/DL
WBC # BLD AUTO: 6.65 K/UL (ref 3.8–10.5)
WBC #/AREA URNS HPF: ABNORMAL /HPF

## 2024-12-07 PROCEDURE — 86140 C-REACTIVE PROTEIN: CPT

## 2024-12-07 PROCEDURE — 85025 COMPLETE CBC W/AUTO DIFF WBC: CPT

## 2024-12-07 PROCEDURE — 86618 LYME DISEASE ANTIBODY: CPT

## 2024-12-07 PROCEDURE — 36415 COLL VENOUS BLD VENIPUNCTURE: CPT

## 2024-12-07 PROCEDURE — 86665 EPSTEIN-BARR CAPSID VCA: CPT

## 2024-12-07 PROCEDURE — 71046 X-RAY EXAM CHEST 2 VIEWS: CPT

## 2024-12-07 PROCEDURE — 81001 URINALYSIS AUTO W/SCOPE: CPT

## 2024-12-07 PROCEDURE — 87040 BLOOD CULTURE FOR BACTERIA: CPT

## 2024-12-07 PROCEDURE — 86617 LYME DISEASE ANTIBODY: CPT

## 2024-12-07 PROCEDURE — 85652 RBC SED RATE AUTOMATED: CPT

## 2024-12-07 PROCEDURE — 80053 COMPREHEN METABOLIC PANEL: CPT

## 2024-12-10 LAB
EBV VCA IGG SER IA-ACNC: 1.15
EBV VCA IGM SER IA-ACNC: 1.97

## 2024-12-11 LAB
B BURGDOR AB SER IA-ACNC: 0.62 RATIO
BACTERIA BLD CULT: NORMAL

## 2024-12-16 LAB
B BURGDOR IGG SER QL IB: NEGATIVE
B BURGDOR IGM SER QL IB: POSITIVE
B BURGDOR18KD IGG SER QL IB: ABNORMAL
B BURGDOR23KD IGG SER QL IB: ABNORMAL
B BURGDOR23KD IGM SER QL IB: REACTIVE
B BURGDOR28KD IGG SER QL IB: ABNORMAL
B BURGDOR30KD IGG SER QL IB: ABNORMAL
B BURGDOR39KD IGG SER QL IB: ABNORMAL
B BURGDOR39KD IGM SER QL IB: REACTIVE
B BURGDOR41KD IGG SER QL IB: REACTIVE
B BURGDOR41KD IGM SER QL IB: REACTIVE
B BURGDOR45KD IGG SER QL IB: ABNORMAL
B BURGDOR58KD IGG SER QL IB: REACTIVE
B BURGDOR66KD IGG SER QL IB: ABNORMAL
B BURGDOR93KD IGG SER QL IB: REACTIVE

## 2025-07-29 PROBLEM — M62.82 RHABDOMYOLYSIS: Status: ACTIVE | Noted: 2024-02-19

## 2025-07-29 PROBLEM — E83.42 HYPOMAGNESEMIA: Status: ACTIVE | Noted: 2024-02-20

## 2025-07-29 PROBLEM — M79.89 SWELLING OF ARM: Status: ACTIVE | Noted: 2024-02-19

## 2025-07-29 PROBLEM — E87.6 HYPOKALEMIA: Status: ACTIVE | Noted: 2024-02-19

## 2025-07-31 ENCOUNTER — OFFICE VISIT (OUTPATIENT)
Dept: FAMILY MEDICINE | Facility: CLINIC | Age: 20
End: 2025-07-31
Payer: COMMERCIAL

## 2025-07-31 VITALS
RESPIRATION RATE: 18 BRPM | SYSTOLIC BLOOD PRESSURE: 128 MMHG | OXYGEN SATURATION: 98 % | TEMPERATURE: 98.2 F | HEART RATE: 55 BPM | BODY MASS INDEX: 27.99 KG/M2 | HEIGHT: 69 IN | WEIGHT: 189 LBS | DIASTOLIC BLOOD PRESSURE: 82 MMHG

## 2025-07-31 DIAGNOSIS — Z86.19 HX OF INFECTIOUS MONONUCLEOSIS: ICD-10-CM

## 2025-07-31 DIAGNOSIS — Z00.00 ROUTINE HEALTH MAINTENANCE: Primary | ICD-10-CM

## 2025-07-31 DIAGNOSIS — Z87.39 HISTORY OF RHABDOMYOLYSIS: ICD-10-CM

## 2025-07-31 PROBLEM — M79.89 SWELLING OF ARM: Status: RESOLVED | Noted: 2024-02-19 | Resolved: 2025-07-31

## 2025-07-31 PROBLEM — E83.42 HYPOMAGNESEMIA: Status: RESOLVED | Noted: 2024-02-20 | Resolved: 2025-07-31

## 2025-07-31 PROBLEM — E87.6 HYPOKALEMIA: Status: RESOLVED | Noted: 2024-02-19 | Resolved: 2025-07-31

## 2025-07-31 LAB
ALBUMIN SERPL-MCNC: 5 G/DL (ref 3.6–5.1)
ALBUMIN/GLOB SERPL: 2 (CALC) (ref 1–2.5)
ALP SERPL-CCNC: 59 U/L (ref 36–130)
ALT SERPL-CCNC: 17 U/L (ref 9–46)
AST SERPL-CCNC: 22 U/L (ref 10–40)
BASOPHILS # BLD AUTO: 78 CELLS/UL (ref 0–200)
BASOPHILS NFR BLD AUTO: 1.3 %
BILIRUB SERPL-MCNC: 0.7 MG/DL (ref 0.2–1.2)
BUN SERPL-MCNC: 26 MG/DL (ref 7–25)
BUN/CREAT SERPL: 33 (CALC) (ref 6–22)
CALCIUM SERPL-MCNC: 9.8 MG/DL (ref 8.6–10.3)
CHLORIDE SERPL-SCNC: 106 MMOL/L (ref 98–110)
CHOLEST SERPL-MCNC: 132 MG/DL
CHOLEST/HDLC SERPL: 2.5 (CALC)
CO2 SERPL-SCNC: 28 MMOL/L (ref 20–32)
CREAT SERPL-MCNC: 0.79 MG/DL (ref 0.6–1.24)
EGFRCR SERPLBLD CKD-EPI 2021: 130 ML/MIN/1.73M2
EOSINOPHIL # BLD AUTO: 78 CELLS/UL (ref 15–500)
EOSINOPHIL NFR BLD AUTO: 1.3 %
ERYTHROCYTE [DISTWIDTH] IN BLOOD BY AUTOMATED COUNT: 12.1 % (ref 11–15)
GLOBULIN SER CALC-MCNC: 2.5 G/DL (CALC) (ref 1.9–3.7)
GLUCOSE SERPL-MCNC: 89 MG/DL (ref 65–99)
HCT VFR BLD AUTO: 40.5 % (ref 38.5–50)
HDLC SERPL-MCNC: 53 MG/DL
HGB BLD-MCNC: 13.9 G/DL (ref 13.2–17.1)
LDLC SERPL CALC-MCNC: 66 MG/DL (CALC)
LYMPHOCYTES # BLD AUTO: 1572 CELLS/UL (ref 850–3900)
LYMPHOCYTES NFR BLD AUTO: 26.2 %
MCH RBC QN AUTO: 31.3 PG (ref 27–33)
MCHC RBC AUTO-ENTMCNC: 34.3 G/DL (ref 32–36)
MCV RBC AUTO: 91.2 FL (ref 80–100)
MONOCYTES # BLD AUTO: 486 CELLS/UL (ref 200–950)
MONOCYTES NFR BLD AUTO: 8.1 %
NEUTROPHILS # BLD AUTO: 3786 CELLS/UL (ref 1500–7800)
NEUTROPHILS NFR BLD AUTO: 63.1 %
NONHDLC SERPL-MCNC: 79 MG/DL (CALC)
PLATELET # BLD AUTO: 260 THOUSAND/UL (ref 140–400)
PMV BLD REES-ECKER: 10.6 FL (ref 7.5–12.5)
POTASSIUM SERPL-SCNC: 4.5 MMOL/L (ref 3.5–5.3)
PROT SERPL-MCNC: 7.5 G/DL (ref 6.1–8.1)
RBC # BLD AUTO: 4.44 MILLION/UL (ref 4.2–5.8)
SODIUM SERPL-SCNC: 138 MMOL/L (ref 135–146)
TRIGL SERPL-MCNC: 45 MG/DL
WBC # BLD AUTO: 6 THOUSAND/UL (ref 3.8–10.8)

## 2025-07-31 PROCEDURE — 3008F BODY MASS INDEX DOCD: CPT | Performed by: STUDENT IN AN ORGANIZED HEALTH CARE EDUCATION/TRAINING PROGRAM

## 2025-07-31 PROCEDURE — 99385 PREV VISIT NEW AGE 18-39: CPT | Performed by: STUDENT IN AN ORGANIZED HEALTH CARE EDUCATION/TRAINING PROGRAM

## 2025-07-31 RX ORDER — EPINEPHRINE 0.3 MG/.3ML
1 INJECTION SUBCUTANEOUS AS NEEDED
Qty: 1 EACH | Refills: 1 | Status: SHIPPED | OUTPATIENT
Start: 2025-07-31 | End: 2025-08-30

## 2025-07-31 ASSESSMENT — PATIENT HEALTH QUESTIONNAIRE - PHQ9: SUM OF ALL RESPONSES TO PHQ9 QUESTIONS 1 & 2: 0

## 2025-07-31 NOTE — PROGRESS NOTES
"Subjective      Patient ID: Francis Fitzpatrick is a 20 y.o. male.  2005      HPI    20M presents to establish care  Prior PCP Maninder    Patient presents for a healthcare maintenance visit.  See A&P for any additional problems addressed today.    He denies new symptoms including HA, CP, palpitations, SOB, N/V/D, urinary symptoms    PMH   Patient Active Problem List   Diagnosis    Rhabdomyolysis     Surgical History: reviewed, see list  Medications: see med list  Allergies: see allergy list    Family history reviewed, see list   Social history    Tobacco?: No    Alcohol?: Yes - Socially  Illicit drugs: No  Work:      Marital status/family: Single        Diet: Balanced, fish, vege, chicken  Exercise:  Exercises Regularly    Eye Doctor: Sees regularly       Dentist: Sees regularly        Screenings  Colonoscopy/FIT (45-76 y/o): N/A    Lung CA screening (50-81 y/o w/ 20 pack-year hx AND currently smoke OR quit within 15 years):  N/A     Labs  Lipids: ordered today   FBS/a1c: N/A           PSA: N/A                                                      Vaccines     Influenza: Up to date  Tdap: Up to date  Pneumovax: N/A Prevnar: N/A      Shingrix/Zostavax (50+): N/A       The following have been reviewed and updated as appropriate in this visit:   Allergies  Meds  Problems       Review of Systems   All other systems reviewed and are negative.      Objective     Vitals:    07/31/25 0902   BP: 128/82   BP Location: Left upper arm   Patient Position: Sitting   Pulse: (!) 55   Resp: 18   Temp: 36.8 °C (98.2 °F)   TempSrc: Tympanic   SpO2: 98%   Weight: 85.7 kg (189 lb)   Height: 1.745 m (5' 8.7\")     Body mass index is 28.15 kg/m².    Physical Exam  Vitals and nursing note reviewed.   Constitutional:       Appearance: Normal appearance.   HENT:      Head: Normocephalic and atraumatic.      Nose: Nose normal.   Eyes:      Extraocular Movements: Extraocular movements intact.      Pupils: Pupils are equal, round, " and reactive to light.   Cardiovascular:      Rate and Rhythm: Normal rate and regular rhythm.      Pulses: Normal pulses.      Heart sounds: Normal heart sounds.   Pulmonary:      Effort: Pulmonary effort is normal.      Breath sounds: Normal breath sounds.   Abdominal:      General: Abdomen is flat.   Musculoskeletal:         General: Normal range of motion.      Cervical back: Normal range of motion.   Skin:     General: Skin is warm.   Neurological:      General: No focal deficit present.      Mental Status: He is alert and oriented to person, place, and time.   Psychiatric:         Mood and Affect: Mood normal.         Assessment & Plan  Routine health maintenance  Up to date with prventative care  Labs ordered  AG reviewed  Orders:    Lipid panel; Future    CBC and differential; Future    Comprehensive metabolic panel; Future    Ambulatory referral to Dermatology; Future    History of rhabdomyolysis  Hx of, resolved       Hx of infectious mononucleosis  Hx of 2024, resolved